# Patient Record
Sex: FEMALE | Race: OTHER | Employment: FULL TIME | URBAN - METROPOLITAN AREA
[De-identification: names, ages, dates, MRNs, and addresses within clinical notes are randomized per-mention and may not be internally consistent; named-entity substitution may affect disease eponyms.]

---

## 2017-10-19 ENCOUNTER — APPOINTMENT (OUTPATIENT)
Dept: GENERAL RADIOLOGY | Age: 37
End: 2017-10-19
Attending: EMERGENCY MEDICINE
Payer: COMMERCIAL

## 2017-10-19 ENCOUNTER — HOSPITAL ENCOUNTER (OUTPATIENT)
Age: 37
Setting detail: OBSERVATION
Discharge: HOME | End: 2017-10-20
Attending: EMERGENCY MEDICINE | Admitting: INTERNAL MEDICINE
Payer: COMMERCIAL

## 2017-10-19 ENCOUNTER — APPOINTMENT (OUTPATIENT)
Dept: CT IMAGING | Age: 37
End: 2017-10-19
Attending: EMERGENCY MEDICINE
Payer: COMMERCIAL

## 2017-10-19 DIAGNOSIS — R55 SYNCOPE, UNSPECIFIED SYNCOPE TYPE: Primary | ICD-10-CM

## 2017-10-19 PROBLEM — E66.9 OBESITY (BMI 30-39.9): Chronic | Status: ACTIVE | Noted: 2017-10-19

## 2017-10-19 PROBLEM — D72.829 LEUKOCYTOSIS: Status: ACTIVE | Noted: 2017-10-19

## 2017-10-19 LAB
ALBUMIN SERPL-MCNC: 3.8 G/DL (ref 3.5–5)
ALBUMIN/GLOB SERPL: 0.9 {RATIO} (ref 1.1–2.2)
ALP SERPL-CCNC: 143 U/L (ref 45–117)
ALT SERPL-CCNC: 21 U/L (ref 12–78)
AMPHET UR QL SCN: NEGATIVE
ANION GAP SERPL CALC-SCNC: 10 MMOL/L (ref 5–15)
APPEARANCE UR: CLEAR
AST SERPL-CCNC: 24 U/L (ref 15–37)
BACTERIA URNS QL MICRO: NEGATIVE /HPF
BARBITURATES UR QL SCN: NEGATIVE
BASOPHILS # BLD: 0 K/UL (ref 0–0.1)
BASOPHILS NFR BLD: 0 % (ref 0–1)
BENZODIAZ UR QL: NEGATIVE
BILIRUB SERPL-MCNC: 0.3 MG/DL (ref 0.2–1)
BILIRUB UR QL: NEGATIVE
BUN SERPL-MCNC: 9 MG/DL (ref 6–20)
BUN/CREAT SERPL: 10 (ref 12–20)
CALCIUM SERPL-MCNC: 9 MG/DL (ref 8.5–10.1)
CANNABINOIDS UR QL SCN: NEGATIVE
CHLORIDE SERPL-SCNC: 103 MMOL/L (ref 97–108)
CO2 SERPL-SCNC: 25 MMOL/L (ref 21–32)
COCAINE UR QL SCN: NEGATIVE
COLOR UR: ABNORMAL
CREAT SERPL-MCNC: 0.89 MG/DL (ref 0.55–1.02)
D DIMER PPP FEU-MCNC: 0.22 MG/L FEU (ref 0–0.65)
DIFFERENTIAL METHOD BLD: ABNORMAL
DRUG SCRN COMMENT,DRGCM: NORMAL
EOSINOPHIL # BLD: 0.1 K/UL (ref 0–0.4)
EOSINOPHIL NFR BLD: 1 % (ref 0–7)
EPITH CASTS URNS QL MICRO: ABNORMAL /LPF
ERYTHROCYTE [DISTWIDTH] IN BLOOD BY AUTOMATED COUNT: 12.8 % (ref 11.5–14.5)
GLOBULIN SER CALC-MCNC: 4.4 G/DL (ref 2–4)
GLUCOSE SERPL-MCNC: 98 MG/DL (ref 65–100)
GLUCOSE UR STRIP.AUTO-MCNC: NEGATIVE MG/DL
HCG UR QL: NEGATIVE
HCT VFR BLD AUTO: 42.6 % (ref 35–47)
HGB BLD-MCNC: 14.2 G/DL (ref 11.5–16)
HGB UR QL STRIP: NEGATIVE
KETONES UR QL STRIP.AUTO: NEGATIVE MG/DL
LACTATE SERPL-SCNC: 0.7 MMOL/L (ref 0.4–2)
LEUKOCYTE ESTERASE UR QL STRIP.AUTO: NEGATIVE
LYMPHOCYTES # BLD: 2.2 K/UL (ref 0.8–3.5)
LYMPHOCYTES NFR BLD: 15 % (ref 12–49)
MCH RBC QN AUTO: 29.2 PG (ref 26–34)
MCHC RBC AUTO-ENTMCNC: 33.3 G/DL (ref 30–36.5)
MCV RBC AUTO: 87.7 FL (ref 80–99)
METHADONE UR QL: NEGATIVE
MONOCYTES # BLD: 0.9 K/UL (ref 0–1)
MONOCYTES NFR BLD: 6 % (ref 5–13)
NEUTS SEG # BLD: 11.9 K/UL (ref 1.8–8)
NEUTS SEG NFR BLD: 78 % (ref 32–75)
NITRITE UR QL STRIP.AUTO: NEGATIVE
OPIATES UR QL: NEGATIVE
PCP UR QL: NEGATIVE
PH UR STRIP: 6.5 [PH] (ref 5–8)
PLATELET # BLD AUTO: 416 K/UL (ref 150–400)
POTASSIUM SERPL-SCNC: 4.1 MMOL/L (ref 3.5–5.1)
PROT SERPL-MCNC: 8.2 G/DL (ref 6.4–8.2)
PROT UR STRIP-MCNC: NEGATIVE MG/DL
RBC # BLD AUTO: 4.86 M/UL (ref 3.8–5.2)
RBC #/AREA URNS HPF: ABNORMAL /HPF (ref 0–5)
SODIUM SERPL-SCNC: 138 MMOL/L (ref 136–145)
SP GR UR REFRACTOMETRY: 1.01 (ref 1–1.03)
TROPONIN I SERPL-MCNC: <0.04 NG/ML
UR CULT HOLD, URHOLD: NORMAL
UROBILINOGEN UR QL STRIP.AUTO: 0.2 EU/DL (ref 0.2–1)
WBC # BLD AUTO: 15.1 K/UL (ref 3.6–11)
WBC URNS QL MICRO: ABNORMAL /HPF (ref 0–4)

## 2017-10-19 PROCEDURE — 71020 XR CHEST PA LAT: CPT

## 2017-10-19 PROCEDURE — 70450 CT HEAD/BRAIN W/O DYE: CPT

## 2017-10-19 PROCEDURE — 93005 ELECTROCARDIOGRAM TRACING: CPT

## 2017-10-19 PROCEDURE — 83605 ASSAY OF LACTIC ACID: CPT | Performed by: EMERGENCY MEDICINE

## 2017-10-19 PROCEDURE — 99285 EMERGENCY DEPT VISIT HI MDM: CPT

## 2017-10-19 PROCEDURE — 81025 URINE PREGNANCY TEST: CPT

## 2017-10-19 PROCEDURE — 85379 FIBRIN DEGRADATION QUANT: CPT | Performed by: EMERGENCY MEDICINE

## 2017-10-19 PROCEDURE — 99218 HC RM OBSERVATION: CPT

## 2017-10-19 PROCEDURE — 81001 URINALYSIS AUTO W/SCOPE: CPT | Performed by: EMERGENCY MEDICINE

## 2017-10-19 PROCEDURE — 74011250636 HC RX REV CODE- 250/636: Performed by: EMERGENCY MEDICINE

## 2017-10-19 PROCEDURE — 36415 COLL VENOUS BLD VENIPUNCTURE: CPT | Performed by: EMERGENCY MEDICINE

## 2017-10-19 PROCEDURE — 96360 HYDRATION IV INFUSION INIT: CPT

## 2017-10-19 PROCEDURE — 84484 ASSAY OF TROPONIN QUANT: CPT | Performed by: EMERGENCY MEDICINE

## 2017-10-19 PROCEDURE — 80053 COMPREHEN METABOLIC PANEL: CPT | Performed by: EMERGENCY MEDICINE

## 2017-10-19 PROCEDURE — 80307 DRUG TEST PRSMV CHEM ANLYZR: CPT | Performed by: EMERGENCY MEDICINE

## 2017-10-19 PROCEDURE — 85025 COMPLETE CBC W/AUTO DIFF WBC: CPT | Performed by: EMERGENCY MEDICINE

## 2017-10-19 RX ADMIN — SODIUM CHLORIDE 1000 ML: 9 INJECTION, SOLUTION INTRAVENOUS at 19:56

## 2017-10-19 NOTE — IP AVS SNAPSHOT
Shefali Foster 
 
 
 [address] 1007 [facility] 
[phone] Patient: Halima Shaver MRN: [id number] UEJ:97/51/3068 You are allergic to the following Allergen Reactions Codeine Nausea Only Immunizations Administered for This Admission Name Date Influenza Vaccine (Quad) PF 10/20/2017 Recent Documentation Height Weight BMI OB Status Smoking Status 1.753 m 96.5 kg 31.42 kg/m2 Having regular periods Never Smoker Unresulted Labs Order Current Status T4, FREE In process Emergency Contacts Name Discharge Info Relation Home Work [employer] DISCHARGE CAREGIVER [3] Life Partner [7] [phone] About your hospitalization You were admitted on:  October 19, 2017 You last received care in the:  [facility] 5M1 MED SURG 1 You were discharged on:  October 20, 2017 Unit phone number:  [phone] Why you were hospitalized Your primary diagnosis was:  Blackout Your diagnoses also included:  Leukocytosis, Obesity (Bmi 30-39.9), Acquired Hypothyroidism Providers Seen During Your Hospitalizations Provider Role Specialty Primary office phone Marco Cintron MD Attending Provider Emergency Medicine [phone] Samantha Ely MD Attending Provider Internal Medicine [phone] Your Primary Care Physician (PCP) Primary Care Physician Office Phone Office Fax NONE ** None ** ** None ** Follow-up Information Follow up With Details Comments Contact Info None   None ([phone]) Patient stated that they have no PCP Zeynep Hairston MD Schedule an appointment as soon as possible for a visit As needed [address] 
[phone] Current Discharge Medication List  
  
CONTINUE these medications which have NOT CHANGED Dose & Instructions Dispensing Information Comments Morning Noon Evening Bedtime buPROPion  mg XL tablet Commonly known as:  Christine Mata Your last dose was: Your next dose is:    
   
   
 Dose:  300 mg Take 300 mg by mouth every morning. Refills:  0  
     
   
   
   
  
 hydrocortisone-pramoxine rectal foam  
Commonly known as:  PROCTOFOAM HC Your last dose was: Your next dose is:    
   
   
 Dose:  1 Applicator Insert 1 Applicator into rectum two (2) times a day. Quantity:  1 Can Refills:  0 PARoxetine 20 mg tablet Commonly known as:  PAXIL Your last dose was: Your next dose is:    
   
   
 Dose:  20 mg Take 20 mg by mouth daily. Refills:  0 PROCTOSOL HC 2.5 % rectal cream  
Generic drug:  hydrocortisone Your last dose was: Your next dose is: Insert  into rectum four (4) times daily. Refills:  0  
     
   
   
   
  
 spironolactone 100 mg tablet Commonly known as:  ALDACTONE Your last dose was: Your next dose is:    
   
   
 Dose:  100 mg Take 100 mg by mouth daily. Refills:  0 Discharge Instructions Patient Discharge Instructions Erika Ramsey / [id number] : 1980 Admitted 10/19/2017 Discharged: 10/20/2017 Primary Diagnoses Problem List as of 10/20/2017  Date Reviewed: 10/20/2017 Codes Class Noted - Resolved * (Principal)Blackout Leukocytosis Obesity (BMI 30-39.9) (Chronic) Take Home Medications · It is important that you take the medication exactly as they are prescribed. · Keep your medication in the bottles provided by the pharmacist and keep a list of the medication names, dosages, and times to be taken in your wallet. · Do not take other medications without consulting your doctor. What to do at [facility] Recommended diet: Regular Diet Recommended activity: Activity as tolerated If you experience worse symptoms, please follow up with your PCP, cardiology, neurology. Follow-up with your PCP in a few weeks Information obtained by : 
I understand that if any problems occur once I am at home I am to contact my physician. I understand and acknowledge receipt of the instructions indicated above. Physician's or R.N.'s Signature                                                                  Date/Time Patient or Representative Signature                                                          Date/Time Discharge Orders None [employer] Announcement We are excited to announce that we are making your provider's discharge notes available to you in [employer]. You will see these notes when they are completed and signed by the physician that discharged you from your recent hospital stay. If you have any questions or concerns about any information you see in [employer], please call the Health Information Department where you were seen or reach out to your Primary Care Provider for more information about your plan of care. Introducing [facility]! Dear Kameron Thomas: Thank you for requesting a [employer] account. Our records indicate that you already have an active [employer] account. You can access your account anytime at [email or web address] Did you know that you can access your hospital and ER discharge instructions at any time in [employer]? You can also review all of your test results from your hospital stay or ER visit. Additional Information If you have questions, please visit the Frequently Asked Questions section of the [employer] website at [email or web address] Remember, MyChart is NOT to be used for urgent needs. For medical emergencies, dial 911. Now available from your iPhone and Android! General Information Please provide this summary of care documentation to your next provider. Patient Signature:  ____________________________________________________________ Date:  ____________________________________________________________  
  
Alecia Rutledge Provider Signature:  ____________________________________________________________ Date:  ____________________________________________________________

## 2017-10-19 NOTE — ED TRIAGE NOTES
1630 I was having a leg cramp and stood up, after It went away I was going to get a glass of water and I passed out. I think I was out for 35-40 minutes, when I woke up I was profusely sweating and felt like I was going to pass out again. No longer feels like I am going to pass out but I am feeling kind of foggy.

## 2017-10-19 NOTE — ED PROVIDER NOTES
HPI Comments: Selma Acosta is a 38 yo F with history of PCOS who presents to the ED with headache following syncopal episode with approximately 45 minute period of unconsciousness. She states that around 4:20 she was messaging on her computer got a cramp in her right calf. She stood to stretch it out which helped and then felt thirsty and went to the kitchen to get a drink. The next thing she knows she has awoken on the floor with her dog licking her face. She had a headache at that time and felt very groggy and was sweating profusely. She denied chest pain, nausea or shortness of breath. She she tried to sit up she felt like she would pass out again. It took several minutes to climb the stairs to her bathroom and then she called her friend at 5:20 to come get her to bring her to the ED. She states that she continues to have a headache which she rates 6/10 and feels fatigued but much better than when she first woke up. She denies history of arhythmia. She does not smoke and does not use OCP's        History reviewed. No pertinent past medical history. Past Surgical History:   Procedure Laterality Date    HX APPENDECTOMY      HX OTHER SURGICAL      anal fisure repair         History reviewed. No pertinent family history. Social History     Social History    Marital status: SINGLE     Spouse name: N/A    Number of children: N/A    Years of education: N/A     Occupational History    Not on file. Social History Main Topics    Smoking status: Never Smoker    Smokeless tobacco: Never Used    Alcohol use No    Drug use: No    Sexual activity: Not on file     Other Topics Concern    Not on file     Social History Narrative         ALLERGIES: Codeine    Review of Systems   Constitutional: Positive for diaphoresis and fatigue. HENT: Negative for sore throat. Eyes: Negative for visual disturbance. Respiratory: Negative for cough and shortness of breath.     Cardiovascular: Positive for syncope. Negative for chest pain. Gastrointestinal: Negative for abdominal pain, nausea and vomiting. Genitourinary: Negative for dysuria. Musculoskeletal: Positive for myalgias (right calf cramp, now resolved). Skin: Negative for rash. Neurological: Positive for syncope and headaches. Vitals:    10/19/17 1955   BP: 141/75   Pulse: 71   Resp: 18   Temp: 98.3 °F (36.8 °C)   SpO2: 96%   Weight: 96.5 kg (212 lb 11.9 oz)   Height: 5' 9\" (1.753 m)            Physical Exam   Constitutional: She appears well-developed and well-nourished. No distress. HENT:   Head: Normocephalic and atraumatic. Mouth/Throat: Oropharynx is clear and moist.   Eyes: Conjunctivae and EOM are normal.   Neck: Normal range of motion and phonation normal.   Cardiovascular: Normal rate, regular rhythm and intact distal pulses. No murmur heard. Pulmonary/Chest: Effort normal. No respiratory distress. She has no decreased breath sounds. She has no wheezes. She has no rales. Abdominal: She exhibits no distension. There is no tenderness. There is no rigidity, no rebound and no guarding. Musculoskeletal: Normal range of motion. She exhibits no tenderness. Right lower leg: She exhibits no tenderness and no swelling. Left lower leg: She exhibits no tenderness and no swelling. Neurological: She is alert. She is not disoriented. No cranial nerve deficit. She exhibits normal muscle tone. Coordination normal.   Skin: Skin is warm and dry. She is not diaphoretic. No pallor. Nursing note and vitals reviewed. ED EKG interpretation:  Rhythm: normal sinus rhythm; and regular . Rate (approx.): 66; Axis: normal; P wave: normal; QRS interval: normal ; ST/T wave: non-specific changes; Other findings: No prior EKG available for comparison. This EKG was interpreted by Darshana Nieves MD,ED Provider. MDM   Syncope/ prolonged period of unconsciousness. Awoke with headache. Will r/o ICH.   Neuro exam normal.  Cardiac monitor normal.  Check labs including troponin, ddimer, CMP, CBC. ED Course     9:35 PM  Discussed with Dr Hesham Velazco. Will admit. Labs reviewed. Will add on urine drug screen.       Procedures

## 2017-10-19 NOTE — IP AVS SNAPSHOT
[address] 
[phone] Patient: Luciano Mccormick MRN: [id number] UTN:09/66/1383 Current Discharge Medication List  
  
CONTINUE these medications which have NOT CHANGED Dose & Instructions Dispensing Information Comments Morning Noon Evening Bedtime buPROPion  mg XL tablet Commonly known as:  Jud Sizer Your last dose was: Your next dose is:    
   
   
 Dose:  300 mg Take 300 mg by mouth every morning. Refills:  0  
     
   
   
   
  
 hydrocortisone-pramoxine rectal foam  
Commonly known as:  PROCTOFOAM HC Your last dose was: Your next dose is:    
   
   
 Dose:  1 Applicator Insert 1 Applicator into rectum two (2) times a day. Quantity:  1 Can Refills:  0 PARoxetine 20 mg tablet Commonly known as:  PAXIL Your last dose was: Your next dose is:    
   
   
 Dose:  20 mg Take 20 mg by mouth daily. Refills:  0 PROCTOSOL HC 2.5 % rectal cream  
Generic drug:  hydrocortisone Your last dose was: Your next dose is: Insert  into rectum four (4) times daily. Refills:  0  
     
   
   
   
  
 spironolactone 100 mg tablet Commonly known as:  ALDACTONE Your last dose was: Your next dose is:    
   
   
 Dose:  100 mg Take 100 mg by mouth daily. Refills:  0

## 2017-10-20 ENCOUNTER — APPOINTMENT (OUTPATIENT)
Dept: MRI IMAGING | Age: 37
End: 2017-10-20
Attending: NURSE PRACTITIONER
Payer: COMMERCIAL

## 2017-10-20 VITALS
SYSTOLIC BLOOD PRESSURE: 125 MMHG | DIASTOLIC BLOOD PRESSURE: 81 MMHG | OXYGEN SATURATION: 97 % | HEART RATE: 75 BPM | RESPIRATION RATE: 18 BRPM | WEIGHT: 212.74 LBS | TEMPERATURE: 98.2 F | HEIGHT: 69 IN | BODY MASS INDEX: 31.51 KG/M2

## 2017-10-20 PROBLEM — E03.9 ACQUIRED HYPOTHYROIDISM: Status: ACTIVE | Noted: 2017-10-20

## 2017-10-20 LAB
ALBUMIN SERPL-MCNC: 3.2 G/DL (ref 3.5–5)
ALBUMIN/GLOB SERPL: 0.9 {RATIO} (ref 1.1–2.2)
ALP SERPL-CCNC: 126 U/L (ref 45–117)
ALT SERPL-CCNC: 18 U/L (ref 12–78)
ANION GAP SERPL CALC-SCNC: 6 MMOL/L (ref 5–15)
AST SERPL-CCNC: 22 U/L (ref 15–37)
ATRIAL RATE: 66 BPM
BILIRUB SERPL-MCNC: 0.3 MG/DL (ref 0.2–1)
BUN SERPL-MCNC: 8 MG/DL (ref 6–20)
BUN/CREAT SERPL: 9 (ref 12–20)
CALCIUM SERPL-MCNC: 8.3 MG/DL (ref 8.5–10.1)
CALCULATED P AXIS, ECG09: 28 DEGREES
CALCULATED R AXIS, ECG10: 28 DEGREES
CALCULATED T AXIS, ECG11: 22 DEGREES
CHLORIDE SERPL-SCNC: 106 MMOL/L (ref 97–108)
CO2 SERPL-SCNC: 27 MMOL/L (ref 21–32)
CREAT SERPL-MCNC: 0.85 MG/DL (ref 0.55–1.02)
DIAGNOSIS, 93000: NORMAL
ERYTHROCYTE [DISTWIDTH] IN BLOOD BY AUTOMATED COUNT: 12.8 % (ref 11.5–14.5)
GLOBULIN SER CALC-MCNC: 3.7 G/DL (ref 2–4)
GLUCOSE SERPL-MCNC: 95 MG/DL (ref 65–100)
HCT VFR BLD AUTO: 37.9 % (ref 35–47)
HGB BLD-MCNC: 12.7 G/DL (ref 11.5–16)
MAGNESIUM SERPL-MCNC: 1.7 MG/DL (ref 1.6–2.4)
MCH RBC QN AUTO: 29.1 PG (ref 26–34)
MCHC RBC AUTO-ENTMCNC: 33.5 G/DL (ref 30–36.5)
MCV RBC AUTO: 86.7 FL (ref 80–99)
P-R INTERVAL, ECG05: 124 MS
PHOSPHATE SERPL-MCNC: 2.5 MG/DL (ref 2.6–4.7)
PLATELET # BLD AUTO: 390 K/UL (ref 150–400)
POTASSIUM SERPL-SCNC: 3.7 MMOL/L (ref 3.5–5.1)
PROT SERPL-MCNC: 6.9 G/DL (ref 6.4–8.2)
Q-T INTERVAL, ECG07: 392 MS
QRS DURATION, ECG06: 78 MS
QTC CALCULATION (BEZET), ECG08: 410 MS
RBC # BLD AUTO: 4.37 M/UL (ref 3.8–5.2)
SODIUM SERPL-SCNC: 139 MMOL/L (ref 136–145)
T4 FREE SERPL-MCNC: 1 NG/DL (ref 0.8–1.5)
TSH SERPL DL<=0.05 MIU/L-ACNC: 6.69 UIU/ML (ref 0.36–3.74)
VENTRICULAR RATE, ECG03: 66 BPM
WBC # BLD AUTO: 10.8 K/UL (ref 3.6–11)

## 2017-10-20 PROCEDURE — 85027 COMPLETE CBC AUTOMATED: CPT | Performed by: INTERNAL MEDICINE

## 2017-10-20 PROCEDURE — 83735 ASSAY OF MAGNESIUM: CPT | Performed by: INTERNAL MEDICINE

## 2017-10-20 PROCEDURE — 70553 MRI BRAIN STEM W/O & W/DYE: CPT

## 2017-10-20 PROCEDURE — 84439 ASSAY OF FREE THYROXINE: CPT | Performed by: NURSE PRACTITIONER

## 2017-10-20 PROCEDURE — 84100 ASSAY OF PHOSPHORUS: CPT | Performed by: INTERNAL MEDICINE

## 2017-10-20 PROCEDURE — 74011250636 HC RX REV CODE- 250/636: Performed by: INTERNAL MEDICINE

## 2017-10-20 PROCEDURE — 99218 HC RM OBSERVATION: CPT

## 2017-10-20 PROCEDURE — 95816 EEG AWAKE AND DROWSY: CPT | Performed by: NURSE PRACTITIONER

## 2017-10-20 PROCEDURE — A9576 INJ PROHANCE MULTIPACK: HCPCS | Performed by: INTERNAL MEDICINE

## 2017-10-20 PROCEDURE — 80053 COMPREHEN METABOLIC PANEL: CPT | Performed by: INTERNAL MEDICINE

## 2017-10-20 PROCEDURE — 84443 ASSAY THYROID STIM HORMONE: CPT | Performed by: NURSE PRACTITIONER

## 2017-10-20 PROCEDURE — 90471 IMMUNIZATION ADMIN: CPT

## 2017-10-20 PROCEDURE — 36415 COLL VENOUS BLD VENIPUNCTURE: CPT | Performed by: INTERNAL MEDICINE

## 2017-10-20 PROCEDURE — 96361 HYDRATE IV INFUSION ADD-ON: CPT

## 2017-10-20 PROCEDURE — 90686 IIV4 VACC NO PRSV 0.5 ML IM: CPT | Performed by: INTERNAL MEDICINE

## 2017-10-20 PROCEDURE — 96372 THER/PROPH/DIAG INJ SC/IM: CPT

## 2017-10-20 RX ORDER — ACETAMINOPHEN 325 MG/1
650 TABLET ORAL
Status: DISCONTINUED | OUTPATIENT
Start: 2017-10-20 | End: 2017-10-20 | Stop reason: HOSPADM

## 2017-10-20 RX ORDER — SODIUM CHLORIDE 9 MG/ML
75 INJECTION, SOLUTION INTRAVENOUS CONTINUOUS
Status: DISCONTINUED | OUTPATIENT
Start: 2017-10-20 | End: 2017-10-20 | Stop reason: HOSPADM

## 2017-10-20 RX ORDER — PROCHLORPERAZINE EDISYLATE 5 MG/ML
10 INJECTION INTRAMUSCULAR; INTRAVENOUS
Status: DISCONTINUED | OUTPATIENT
Start: 2017-10-20 | End: 2017-10-20 | Stop reason: HOSPADM

## 2017-10-20 RX ORDER — ZOLPIDEM TARTRATE 5 MG/1
5 TABLET ORAL
Status: DISCONTINUED | OUTPATIENT
Start: 2017-10-20 | End: 2017-10-20 | Stop reason: HOSPADM

## 2017-10-20 RX ORDER — ENOXAPARIN SODIUM 100 MG/ML
40 INJECTION SUBCUTANEOUS EVERY 24 HOURS
Status: DISCONTINUED | OUTPATIENT
Start: 2017-10-20 | End: 2017-10-20 | Stop reason: HOSPADM

## 2017-10-20 RX ORDER — SODIUM CHLORIDE 0.9 % (FLUSH) 0.9 %
5-10 SYRINGE (ML) INJECTION AS NEEDED
Status: DISCONTINUED | OUTPATIENT
Start: 2017-10-20 | End: 2017-10-20 | Stop reason: HOSPADM

## 2017-10-20 RX ORDER — OXYCODONE AND ACETAMINOPHEN 5; 325 MG/1; MG/1
1 TABLET ORAL
Status: DISCONTINUED | OUTPATIENT
Start: 2017-10-20 | End: 2017-10-20 | Stop reason: HOSPADM

## 2017-10-20 RX ORDER — HYDROMORPHONE HYDROCHLORIDE 1 MG/ML
0.5 INJECTION, SOLUTION INTRAMUSCULAR; INTRAVENOUS; SUBCUTANEOUS
Status: DISCONTINUED | OUTPATIENT
Start: 2017-10-20 | End: 2017-10-20 | Stop reason: HOSPADM

## 2017-10-20 RX ORDER — SODIUM CHLORIDE 0.9 % (FLUSH) 0.9 %
5-10 SYRINGE (ML) INJECTION EVERY 8 HOURS
Status: DISCONTINUED | OUTPATIENT
Start: 2017-10-20 | End: 2017-10-20 | Stop reason: HOSPADM

## 2017-10-20 RX ADMIN — SODIUM CHLORIDE 75 ML/HR: 900 INJECTION, SOLUTION INTRAVENOUS at 02:16

## 2017-10-20 RX ADMIN — GADOTERIDOL 15 ML: 279.3 INJECTION, SOLUTION INTRAVENOUS at 16:52

## 2017-10-20 RX ADMIN — INFLUENZA VIRUS VACCINE 0.5 ML: 15; 15; 15; 15 SUSPENSION INTRAMUSCULAR at 17:52

## 2017-10-20 RX ADMIN — Medication 10 ML: at 02:17

## 2017-10-20 RX ADMIN — Medication 10 ML: at 14:00

## 2017-10-20 RX ADMIN — ENOXAPARIN SODIUM 40 MG: 40 INJECTION SUBCUTANEOUS at 08:49

## 2017-10-20 NOTE — ED NOTES
Patient resting on stretcher in no distress. Patient updated regarding room number. Patient repositioned for comfort.

## 2017-10-20 NOTE — ED NOTES
Patient ambulatory to restroom with a steady gait to obtain ordered sample. Patient tolerating ambulation well.

## 2017-10-20 NOTE — ED NOTES
IV access established and blood samples sent to lab for testing as ordered. Patient updated regarding plan of care and associated time constraints. Patient verbalizes understanding and agreement to current care plan. Patient remains alert and oriented. Skin warm, pink, and dry. Respirations even and unlabored. Family at bedside.

## 2017-10-20 NOTE — PROGRESS NOTES
0100  Primary Nurse Trinity Mcmillan RN and Tyrone Aguila RN performed a dual skin assessment on this patient No impairment noted  Martin score is 21    0045   Dr. Mian Godinez made aware that pt on floor. 7536  TRANSFER - IN REPORT:    Verbal report received from Leticia Galo RN(name) on Jose Nelson  being received from ED (unit) for routine progression of care      Report consisted of patients Situation, Background, Assessment and   Recommendations(SBAR). Information from the following report(s) SBAR, Kardex, Intake/Output, MAR, Recent Results and Med Rec Status was reviewed with the receiving nurse. Opportunity for questions and clarification was provided. Assessment completed upon patients arrival to unit and care assumed.

## 2017-10-20 NOTE — PROGRESS NOTES
CM Note:  Met with pt for d/c planning. PTA pt was independent with ADL's, was driving and walking. No DME at home. She has never had home health. Her emergency contact is her partner, Thai Lee ([phone]), who will drive her home at d/c. Pt has Rx coverage and gets her medication from [facility] in [place]. No anticipated needs for d/c. Home when medically stable.   MEGHANA Lim    Care Management Interventions  PCP Verified by CM: No (Patient was given a list of Day[facility] Lewis providers as she is without a PCP at this time.)  Palliative Care Criteria Met (RRAT>21 & CHF Dx)?: No  MyChart Signup: No  Discharge Durable Medical Equipment: No  Physical Therapy Consult: No  Occupational Therapy Consult: No  Speech Therapy Consult: No  Current Support Network: Lives with Spouse (Pt lives with her partner in a 2 story house with 15-20 steps between floors.)  Confirm Follow Up Transport: Family (Pt's partner to drive her home at d/c.)  Plan discussed with Pt/Family/Caregiver: Yes  Discharge Location  Discharge Placement: Home with two level

## 2017-10-20 NOTE — ED NOTES
TRANSFER - OUT REPORT:    Verbal report given to Jos Leos with [facility](name) on Rosalinda Gonzalez  being transferred to [facility] 509(unit) for routine progression of care       Report consisted of patients Situation, Background, Assessment and   Recommendations(SBAR). Information from the following report(s) ED Summary was reviewed with the receiving nurse. Lines:   Peripheral IV 10/19/17 Right Antecubital (Active)   Site Assessment Clean, dry, & intact 10/19/2017  7:50 PM   Phlebitis Assessment 0 10/19/2017  7:50 PM   Infiltration Assessment 0 10/19/2017  7:50 PM   Dressing Status Clean, dry, & intact 10/19/2017  7:50 PM   Dressing Type Transparent 10/19/2017  7:50 PM   Hub Color/Line Status Pink;Flushed 10/19/2017  7:50 PM   Action Taken Blood drawn 10/19/2017  7:50 PM        Opportunity for questions and clarification was provided.       Patient transported with:   Monitor  [address]

## 2017-10-20 NOTE — ED NOTES
TRANSPORTED VIA STRETCHER BY [facility] TO [facility] 509. Continues complaining of head ache.

## 2017-10-20 NOTE — ED NOTES
TRANSFER - OUT REPORT:    Verbal report given to Estelle Pineda RN(name) on Suhail Watts  being transferred to [facility] 509(unit) for routine progression of care       Report consisted of patients Situation, Background, Assessment and   Recommendations(SBAR). Information from the following report(s) SBAR, ED Summary, Recent Results and Cardiac Rhythm NSR was reviewed with the receiving nurse. Lines:   Peripheral IV 10/19/17 Right Antecubital (Active)   Site Assessment Clean, dry, & intact 10/19/2017  7:50 PM   Phlebitis Assessment 0 10/19/2017  7:50 PM   Infiltration Assessment 0 10/19/2017  7:50 PM   Dressing Status Clean, dry, & intact 10/19/2017  7:50 PM   Dressing Type Transparent 10/19/2017  7:50 PM   Hub Color/Line Status Pink;Flushed 10/19/2017  7:50 PM   Action Taken Blood drawn 10/19/2017  7:50 PM        Opportunity for questions and clarification was provided.       Patient transported with:   Monitor    Saline Lock

## 2017-10-20 NOTE — PROGRESS NOTES
Hemal Rueda [facility] [address]  [address], [place], [address] [facility]  [phone]      Medical Progress Note      NAME: Berta Douglas   :  1980  MRM:  [id number]    Date/Time: 10/20/2017  9:28 AM       Assessment and Plan:     Blackout - Unclear etiology. Several years of monthly recurrent milder self limited symptoms (weak, light headed, sweaty, confusion). Sz vs Vagal vs arrhythmia vs other. So far negative workup. Normal neuro exam.  Awaiting EEG. Normal orthostatics. Normal tele, but I will conslut cadiology, as I think some likelihood of prince/pauses requiring Holter analysis. If cleared by neurology, can DC home. Driving restrictions per their recs    Leukocytosis - POA, mild, resolved spontaneously. No ABx. No further workup. Obesity (BMI 30-39.9) - Advise weight loss       Subjective:     Chief Complaint:  Feels well now    ROS:  (bold if positive, if negative)      Tolerating PT   Tolerating Diet        Objective:     Last 24hrs VS reviewed since prior progress note.  Most recent are:    Visit Vitals    /63 (BP 1 Location: Left arm, BP Patient Position: At rest)    Pulse 73    Temp 98.2 °F (36.8 °C)    Resp 18    Ht 5' 9\" (1.753 m)    Wt 96.5 kg (212 lb 11.9 oz)    SpO2 95%    BMI 31.42 kg/m2     SpO2 Readings from Last 6 Encounters:   10/20/17 95%   16 98%   16 96%   14 96%        No intake or output data in the 24 hours ending 10/20/17 0098     Physical Exam:    Gen:  Obese, in no acute distress  HEENT:  Pink conjunctivae, PERRL, hearing intact to voice, moist mucous membranes  Neck:  Supple, without masses, thyroid non-tender  Resp:  No accessory muscle use, clear breath sounds without wheezes rales or rhonchi  Card:  No murmurs, normal S1, S2 without thrills, bruits or peripheral edema  Abd:  Soft, non-tender, non-distended, normoactive bowel sounds are present, no mass  Lymph:  No cervical or inguinal adenopathy  Musc:  No cyanosis or clubbing  Skin:  No rashes or ulcers, skin turgor is good  Neuro:  Cranial nerves are grossly intact, no focal motor weakness, follows commands appropriately  Psych:  Good insight, oriented to person, place and time, alert    Telemetry reviewed:   normal sinus rhythm  __________________________________________________________________  Medications Reviewed: (see below)  Medications:     Current Facility-Administered Medications   Medication Dose Route Frequency    0.9% sodium chloride infusion  75 mL/hr IntraVENous CONTINUOUS    sodium chloride (NS) flush 5-10 mL  5-10 mL IntraVENous Q8H    sodium chloride (NS) flush 5-10 mL  5-10 mL IntraVENous PRN    acetaminophen (TYLENOL) tablet 650 mg  650 mg Oral Q4H PRN    oxyCODONE-acetaminophen (PERCOCET) 5-325 mg per tablet 1 Tab  1 Tab Oral Q4H PRN    HYDROmorphone (PF) (DILAUDID) injection 0.5 mg  0.5 mg IntraVENous Q4H PRN    prochlorperazine (COMPAZINE) injection 10 mg  10 mg IntraVENous Q6H PRN    zolpidem (AMBIEN) tablet 5 mg  5 mg Oral QHS PRN    enoxaparin (LOVENOX) injection 40 mg  40 mg SubCUTAneous Q24H    influenza vaccine 2017-18 (3 yrs+)(PF) (FLUZONE QUAD/FLUARIX QUAD) injection 0.5 mL  0.5 mL IntraMUSCular PRIOR TO DISCHARGE        Lab Data Reviewed: (see below)  Lab Review:     Recent Labs      10/20/17   0535  10/19/17   1953   WBC  10.8  15.1*   HGB  12.7  14.2   HCT  37.9  42.6   PLT  390  416*     Recent Labs      10/20/17   0535  10/19/17   1953   NA  139  138   K  3.7  4.1   CL  106  103   CO2  27  25   GLU  95  98   BUN  8  9   CREA  0.85  0.89   CA  8.3*  9.0   MG  1.7   --    PHOS  2.5*   --    ALB  3.2*  3.8   TBILI  0.3  0.3   SGOT  22  24   ALT  18  21     No results found for: GLUCPOC  No results for input(s): PH, PCO2, PO2, HCO3, FIO2 in the last 72 hours. No results for input(s): INR in the last 72 hours.     No lab exists for component: INREXT  All Micro Results     Procedure Component Value Units Date/Time    URINE CULTURE HOLD SAMPLE [[id number]] Collected:  10/19/17 2052    Order Status:  Completed Specimen:  Urine from Serum Updated:  10/19/17 2057     Urine culture hold         URINE ON HOLD IN MICROBIOLOGY DEPT FOR 3 DAYS          I have reviewed notes of prior 24hr.     Other pertinent lab: none    Total time spent with patient: 28 [address] discussed with: Patient, Nursing Staff, Consultant/Specialist and >50% of time spent in counseling and coordination of care    Discussed:  Care Plan and D/C Planning    Prophylaxis:  H2B/PPI    Disposition:  Home w/Family           ___________________________________________________    Attending Physician: Shira Rutledge MD

## 2017-10-20 NOTE — PROCEDURES
Hemal Rueda [facility]   201 [facility], [address]   ROUTINE EEG REPORT       Name:  Mor Santiago   MR#:  [id number]   :  1980   Account #:  [de-identified]    Date of Procedure:  10/20/2017   Date of Adm:  10/19/2017       LOCATION: [facility]. REASON FOR STUDY: A 63-year-old female who was admitted for an   episode of syncope. She reports having sporadic syncopal episodes in   the past. No prior history of seizure. This study is being done to   evaluate for the cause of her syncope. CURRENT MEDICATIONS:   1. Wellbutrin. 2. Paxil. 3. Aldactone. 4. ProctoFoam.   5. Proctosol. EQUIPMENT: Technical study is performed using a digital EEG   machine according to the 10-20 [employer] placement system. INTERPRETATION: The patient is described as awake during this   recording. The background is symmetric and medium to low in   amplitude. Posterior dominant rhythm reaches 9 Hz bilaterally and is   responsive to eye opening and eye closure. Hyperventilation is done   and is unremarkable. Photic stimulation does not result in a clear   driving response on either side. The background rhythm waxes and   wanes toward the end of the recording consistent with drowsiness. Single-lead EKG was unremarkable. There are no epileptiform   discharges, focal areas of slowing, or subclinical seizures during the   recording. IMPRESSION: This is a normal awake and briefly drowsy EEG   recording. There were no epileptiform discharges, focal areas of   slowing, or subclinical seizures. Clinical correlation is necessary.         Leo Bo MD MA / LENNY   D:  10/20/2017   12:33   T:  10/20/2017   16:06   Job #:  [id number]

## 2017-10-20 NOTE — DISCHARGE SUMMARY
Physician Discharge Summary     Patient ID:  Loetta Fothergill  [id number]  89 y.o.  1980    Admit date: 10/19/2017    Discharge date and time: 10/20/2017    Admission Diagnoses: Syncope    Discharge Diagnoses:    Principal Diagnosis   Blackout                                             Other Diagnoses    Leukocytosis (10/19/2017)    Obesity (BMI 30-39.9) (10/19/2017)    PCOS /     Hypertestosteronism    Anxiety / Depression    Hospital Course:   Blackout - Unclear etiology. Several years of monthly recurrent milder self limited symptoms (weak, light headed, sweaty, confusion). Sz vs Vagal vs arrhythmia vs other. So far negative workup. Normal neuro exam.  Awaiting EEG. Normal orthostatics. Normal tele, but I will conslut cadiology, as I think some likelihood of prince/pauses requiring Holter analysis. If cleared by neurology, can DC home. Driving restrictions per their recs. Elevated TSH - I noted TSH 6.7, above baseline, but no other signs of overt hypothyroidism, and I will not start synthroid at this point. PCP can follow.     PCOS / Hypertestosteronism - Continue spironolactone    Anxiety / Depression - Continue wellbutrin and paxil     Leukocytosis - POA, mild, resolved spontaneously. No ABx. No further workup.     Obesity (BMI 30-39.9) - Advise weight loss    PCP: None    Consults: Cardiology and Neurology    Significant Diagnostic Studies: See Hospital Course    Discharged home in improved condition. Discharge Exam:   /63 (BP 1 Location: Left arm, BP Patient Position: At rest)    Pulse 73    Temp 98.2 °F (36.8 °C)    Resp 18    Ht 5' 9\" (1.753 m)    Wt 96.5 kg (212 lb 11.9 oz)    SpO2 95%    BMI 31.42 kg/m2      Gen:  Obese, in no acute distress  HEENT:  Pink conjunctivae, PERRL, hearing intact to voice, moist mucous membranes  Neck:  Supple, without masses, thyroid non-tender  Resp:  No accessory muscle use, clear breath sounds without wheezes rales or rhonchi  Card:   No murmurs, normal S1, S2 without thrills, bruits or peripheral edema  Abd:  Soft, non-tender, non-distended, normoactive bowel sounds are present, no mass  Lymph:  No cervical or inguinal adenopathy  Musc:  No cyanosis or clubbing  Skin:  No rashes or ulcers, skin turgor is good  Neuro:  Cranial nerves are grossly intact, no focal motor weakness, follows commands appropriately  Psych:  Good insight, oriented to person, place and time, alert    Patient Instructions:   Current Discharge Medication List      CONTINUE these medications which have NOT CHANGED    Details   buPROPion XL (WELLBUTRIN XL) 300 mg XL tablet Take 300 mg by mouth every morning. PARoxetine (PAXIL) 20 mg tablet Take 20 mg by mouth daily. spironolactone (ALDACTONE) 100 mg tablet Take 100 mg by mouth daily. hydrocortisone (PROCTOSOL HC) 2.5 % rectal cream Insert  into rectum four (4) times daily. hydrocortisone-pramoxine (PROCTOFOAM HC) rectal foam Insert 1 Applicator into rectum two (2) times a day. Qty: 1 Can, Refills: 0           Activity: Activity as tolerated  Diet: Regular Diet  Wound Care: None needed    Follow-up with your PCP in a few weeks.   Follow-up tests/labs - none    Signed:  Jennifer Barbosa MD  10/20/2017  9:50 AM

## 2017-10-20 NOTE — PROGRESS NOTES
I have reviewed discharge instructions with the patient. The patient verbalized understanding. I have reviewed discharge instructions with the patient. The patient verbalized understanding. Per Dr. Arely Restrepo patient may discharge if cardiologist could set patient up for Holter monitor outpatient. Dr. Fidencio Skelton contacted and stated he would set this up outpatient, and would contact patient to set-up an appointment.

## 2017-10-20 NOTE — ED NOTES
IV fluid infusion completed. Patient tolerated fluids well. Family remains at bedside. Patient voices no requests or concerns at this time. Toileting offered; patient declines at this time.

## 2017-10-20 NOTE — PROGRESS NOTES
Problem: Falls - Risk of  Goal: *Absence of Falls  Document Jose Fall Risk and appropriate interventions in the flowsheet.    Outcome: Progressing Towards Goal  Fall Risk Interventions:            Medication Interventions: Teach patient to arise slowly

## 2017-10-20 NOTE — ED NOTES
Dr. Gerard  at bedside to update patient regarding results. Further care management includes admission to [facility]. Patient verbalizes understanding and agreement.

## 2017-10-20 NOTE — H&P
[address]  [phone]    Admission History and Physical      NAME:  Erika Ramsey   :   1980   MRN:  [id number]     PCP:  None     Date/Time:  10/20/2017         Subjective:     CHIEF COMPLAINT: loss of consciouness     HISTORY OF PRESENT ILLNESS:     Ms. Rubén Ansari is a 39 y.o.  female who is admitted with blackout spell. Ms. Rubén Ansari presented to the [facility] this PM complaining of blackout spell: earlier this evening, go up to get a glass of water, lost 35-40 minutes of time, woke up on the floor, on her back, had bitten her lip, profusely sweating, was confused and felt like she might lose consciousness again for a short time afterwards    History obtained from chart review and the patient. Previous records reviewed    History reviewed. No pertinent past medical history. Past Surgical History:   Procedure Laterality Date    HX APPENDECTOMY      HX OTHER SURGICAL      anal fisure repair       Social History   Substance Use Topics    Smoking status: Never Smoker    Smokeless tobacco: Never Used    Alcohol use No        Family History   Problem Relation Age of Onset    Diabetes Mother     Diabetes Father         Allergies   Allergen Reactions    Codeine Nausea Only        Prior to Admission medications    Medication Sig Start Date End Date Taking? Authorizing Provider   buPROPion XL (WELLBUTRIN XL) 300 mg XL tablet Take 300 mg by mouth every morning. Yes Judit Hilliard MD   PARoxetine (PAXIL) 20 mg tablet Take 20 mg by mouth daily. Yes Judit Hilliard MD   spironolactone (ALDACTONE) 100 mg tablet Take 100 mg by mouth daily. Yes Judit Hilliard MD   hydrocortisone (PROCTOSOL HC) 2.5 % rectal cream Insert  into rectum four (4) times daily. Judit Hilliard MD   hydrocortisone-pramoxine (PROCTOFOAM HC) rectal foam Insert 1 Applicator into rectum two (2) times a day.  16   Shahana Hebert Abron Gowers, PA-C         Review of Systems:  (bold if positive, if negative)    Gen:  Eyes:  ENT:  CVS:  dizziness, syncopePulm:  GI:    :    MS:  Skin:  Psych:  Endo:    Hem:  Renal:    Neuro:            Objective:      VITALS:    Vital signs reviewed; most recent are:    Visit Vitals    /80 (BP 1 Location: Left arm, BP Patient Position: Standing)    Pulse 68    Temp 98.4 °F (36.9 °C)    Resp 19    Ht 5' 9\" (1.753 m)    Wt 96.5 kg (212 lb 11.9 oz)    SpO2 98%    BMI 31.42 kg/m2     SpO2 Readings from Last 6 Encounters:   10/20/17 98%   09/01/16 98%   05/25/16 96%   06/24/14 96%        No intake or output data in the 24 hours ending 10/20/17 0657         Exam:     Physical Exam:    Gen: Well-developed, obese, in no acute distress  HEENT:  Pink conjunctivae, PERRL, hearing intact to voice, moist mucous membranes  Neck: Supple, without masses, thyroid non-tender  Resp: No accessory muscle use, clear breath sounds without wheezes rales or rhonchi  Card: No murmurs, normal S1, S2 without thrills, bruits or peripheral edema  Abd:  Soft, non-tender, non-distended, normoactive bowel sounds are present, no palpable organomegaly and no detectable hernias  Lymph:  No cervical or inguinal adenopathy  Musc: No cyanosis or clubbing  Skin: No rashes or ulcers, skin turgor is good  Neuro:  Cranial nerves are grossly intact, no focal motor weakness, follows commands appropriately  Psych:  Good insight, oriented to person, place and time, alert             Labs:    Recent Labs      10/20/17   0535   WBC  10.8   HGB  12.7   HCT  37.9   PLT  390     Recent Labs      10/20/17   0535   NA  139   K  3.7   CL  106   CO2  27   GLU  95   BUN  8   CREA  0.85   CA  8.3*   MG  1.7   PHOS  2.5*   ALB  3.2*   TBILI  0.3   SGOT  22   ALT  18     No results found for: GLUCPOC  No results for input(s): PH, PCO2, PO2, HCO3, FIO2 in the last 72 hours. No results for input(s): INR in the last 72 hours.     No lab exists for component: INREXT, INREXT    Additional testing:  Chest X-ray: Normal. and visualized by me.   Results not reviewed with Radiologist.       Assessment/Plan:       Principal Problem:    Blackout (10/19/2017)   - I do not think this represents true syncope, the duration is far too long for it to have been a cardiac source   - I'm not sure what this episode of LOC represents, however   - drug screen is negative so drug use seems unlikely    - a seizure seems most likely   - consult Neurology   - seizure precautions   - will likely need EEG   - check orthostatics    Active Problems:    Leukocytosis (10/19/2017)   - no apparent infection   - likely elevated due to acute issue above, possibly seizure activity      Obesity (BMI 30-39.9) (10/19/2017)   - counseled on weight loss        Code status:   - patient is FULL CODE      Total time spent with patient: 79 [address] discussed with: Patient, Family, Nursing Staff and Dr. Vasquez Many    Discussed:  Code Status, Care Plan and D/C Planning    Prophylaxis:  Lovenox and SCD's    Probable Disposition:  Home w/Family           ___________________________________________________    Attending Physician: Rosa Parks MD

## 2017-10-20 NOTE — CONSULTS
[facility]  [address] [place] [address] Ann  [phone]     Inpatient Neurology Consult  Dakota Goff, [facility]    Name:   Giles Cotto   Medical record #: [id number]  Admission Date: 10/19/2017  Consult Date:  10/20/17    Referring Provider: Dr. Juan Antonio Cotter  Chief Complaint:  syncope  Source of Hx:  Chart, pt    ____________________________________________________________________  ==============================================================    Attending Addendum    Reviewed consult note by NP Bhavna Jefferson. Agree with hx as obtained by her. Patient independently interviewed and examined. Briefly, 39 y.o. female who is admitted after syncopal episode. She had gotten up to stretch after a leg cramp, says it wasn't that painful, was walking to kitchen to get something to drink and next thing she knows she's waking up on ground about 30 minutes later, profuse sweating and felt fatigued afterwards. No warning, no palpitations, no light-headedness before the episode. Agree with NPs Mani's summary of patient reporting intermittent spells over past couple years where she'll just have a sensation that something isn't right, feels light-headed, needs to sit down, gets warm sensation all over (all that lasting 10-15 minutes) then feels wiped out afterwards. Never passed out before the episode that prompted this admission. No personal or FHx of seizure. EEG was normal.  CT head w/o contrast was also normal (reviewed images). MRI Brain pending. Cardiology to see regarding possible bradycardia or other cardiac dysrhythmia to account for syncopal episode (and prior spells?). Pt reports that her prior PCP was uncertain if she had hypothyroid (TSH was up and down) and had given her a Rx synthroid to fill at last visit but pt hasn't gotten around to filling it. Recent TSH 6.67. No current facility-administered medications on file prior to encounter.       Current Outpatient Prescriptions on File Prior to Encounter   Medication Sig Dispense Refill    buPROPion XL (WELLBUTRIN XL) 300 mg XL tablet Take 300 mg by mouth every morning.  PARoxetine (PAXIL) 20 mg tablet Take 20 mg by mouth daily.  spironolactone (ALDACTONE) 100 mg tablet Take 100 mg by mouth daily.  hydrocortisone (PROCTOSOL HC) 2.5 % rectal cream Insert  into rectum four (4) times daily.  hydrocortisone-pramoxine (PROCTOFOAM HC) rectal foam Insert 1 Applicator into rectum two (2) times a day. 1 Can 0       Patient Vitals for the past 8 hrs:   Temp Pulse Resp BP SpO2   10/20/17 1238 98.2 °F (36.8 °C) 72 18 130/80 97 %   10/20/17 0737 98.2 °F (36.8 °C) 73 18 120/63 95 %         Exam  General: awake, alert, resting, NAD. CN: EOMI, Face symmetric, Facial sensation intact bilaterally, Hearing grossly normal, Language normal (no aphasia, no dysarthria), Shrug symmetric. Motor: 5/5 in all exts. Sensory: normal LT, prick, temp, vibration x 4 exts. Cerebellar: normal FNF and H-Shin bilaterally; no tremors. DTRs: 2+ patellars, 1+ biceps; Gait: not observed    Impression/ Plan    39 y.o. female with episodes of pre-syncope for past 1-2 years and current admission for syncopal episode without warning. Known hypothyroidism (not treated yet). Never any witnessed seizure and EEG/ CT head was normal.  MRI Brain pending. DDx: vasovagal syncope vs cardiogenic syncope vs autonomic neuropathy vs complex partial seizure    Pt to see new PCP and address hypothyroidism, have cardiology eval, and if symptoms persist, she will schedule outpatient neurology follow up visit. At that point would do NCS and send for tilt-table testing to determine if any underlying neuropathy causing dizziness/ pre-syncopal spells. Will also get sleep deprived EEG at that point. Reviewed meds and discussed with pt that both wellbutrin and paxil can cause diaphoresis and dizziness.   She says she had considered that in the past and stopped either med at various times but still had the near-syncopal episodes. Okay to discharge home today if Brain MRI is normal.  D/w patient that [address] [facility] prohibits people from driving x 6 months after their last syncopal episode or seizure involving loss or alteration of awareness. She expressed understanding. Jim Leach MD  October 20, 2017          HISTORY OF PRESENT ILLNESS:   Sagrario Ureña is a 39 y.o. female with no significant PMH. The Neurology Service is asked to evaluate for syncope, after admission for patient having syncopal event with diaphoresis yesterday. 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 Her friend messaged her and she read part of text but continued walking to kitchen and all she remembers is waking up in the hallway before the kitchen without having made it to kitchen when her dog was licking her. She looked at nearby clock it was around  515pm--she says she was \"out for almost an hour per the last text she read and when she woke and looked at clock. She tried to sit up but also felt as if she was going to pass out again. Also felt so fatigued she turned on fan to keep herself awake and called friend to bring her to ED. Reports bilateral frontal HA post fall, improved now. Recent events starting about 18mo ago includes increasing intermittent/unpredictable excessive sweating episodes lasting 10-15 minutes. She also feels lightheaded, off balanced when walking away to be alone if in public, has difficulty speaking, slowed thought processing during event and dizziness. States after event occurs, she is extremely tired and wants to sleep to feel better, she is just worn out after the above events and can't do as much the rest of the day after it occurs.   Reports one event when giving blood when she was younger that she fell on floor, had urinary incontinence and bit her tongue. Has had bilateral hand paresthesias for several months, intermittent. Denies seizure hx or family sz hx, no palpitations, angina, frequent falls, vision changes, unexplained weight loss, acute weakness, or medication changes recently. History reviewed. No pertinent past medical history. Past Surgical History:   Procedure Laterality Date    HX APPENDECTOMY      HX OTHER SURGICAL      anal fisure repair     Family History   Problem Relation Age of Onset    Diabetes Mother     Diabetes Father      Social History     Social History    Marital status: SINGLE     Spouse name: N/A    Number of children: N/A    Years of education: N/A     Occupational History    Not on file. Social History Main Topics    Smoking status: Never Smoker    Smokeless tobacco: Never Used    Alcohol use No    Drug use: No    Sexual activity: Not on file     Other Topics Concern    Not on file     Social History Narrative       Objective  Allergies: Allergies   Allergen Reactions    Codeine Nausea Only     Outpatient Meds  No current facility-administered medications on file prior to encounter. Current Outpatient Prescriptions on File Prior to Encounter   Medication Sig Dispense Refill    buPROPion XL (WELLBUTRIN XL) 300 mg XL tablet Take 300 mg by mouth every morning.  PARoxetine (PAXIL) 20 mg tablet Take 20 mg by mouth daily.  spironolactone (ALDACTONE) 100 mg tablet Take 100 mg by mouth daily.  hydrocortisone (PROCTOSOL HC) 2.5 % rectal cream Insert  into rectum four (4) times daily.  hydrocortisone-pramoxine (PROCTOFOAM HC) rectal foam Insert 1 Applicator into rectum two (2) times a day.  1 Can 0       Inpatient Meds    Current Facility-Administered Medications:     0.9% sodium chloride infusion, 75 mL/hr, IntraVENous, CONTINUOUS, Kam Chan MD, Last Rate: 75 mL/hr at 10/20/17 0216, 75 mL/hr at 10/20/17 0216    sodium chloride (NS) flush 5-10 mL, 5-10 mL, IntraVENous, Q8H, Mady Mejias MD, 10 mL at 10/20/17 0217    sodium chloride (NS) flush 5-10 mL, 5-10 mL, IntraVENous, PRN, Mady Mejias MD    acetaminophen (TYLENOL) tablet 650 mg, 650 mg, Oral, Q4H PRN, Mady Mejias MD    oxyCODONE-acetaminophen (PERCOCET) 5-325 mg per tablet 1 Tab, 1 Tab, Oral, Q4H PRN, Mady Mejias MD    HYDROmorphone (PF) (DILAUDID) injection 0.5 mg, 0.5 mg, IntraVENous, Q4H PRN, Mady Mejias MD    prochlorperazine (COMPAZINE) injection 10 mg, 10 mg, IntraVENous, Q6H PRN, Mady Mejias MD    zolpidem [facility]) tablet 5 mg, 5 mg, Oral, QHS PRN, Mady Mejias MD    enoxaparin (LOVENOX) injection 40 mg, 40 mg, SubCUTAneous, Q24H, Mady Mejias MD, 40 mg at 10/20/17 0849    influenza vaccine 2017-18 (3 yrs+)(PF) (FLUZONE QUAD/FLUARIX QUAD) injection 0.5 mL, 0.5 mL, IntraMUSCular, PRIOR TO DISCHARGE, Mady Mejias MD    PHYSICAL EXAM  Patient Vitals for the past 12 hrs:   Temp Pulse Resp BP SpO2   10/20/17 1238 98.2 °F (36.8 °C) 72 18 130/80 97 %   10/20/17 0737 98.2 °F (36.8 °C) 73 18 120/63 95 %   10/20/17 0700 - 80 - - -   10/20/17 0339 - - - 142/80 -   10/20/17 8442 - - - 120/73 -   10/20/17 0335 98.4 °F (36.9 °C) 68 19 115/64 98 %        General: overweight pleasant WF in NAD, providing accurate history    Psych: Affect is calm, cooperative, pleasant   Neck: supple, nontender,  No bruit   Heart: regular rhythm and rate     Lungs: clear BBS   Extremities: no LE edema Skin: no rashes      Neurological Examination:    Mental Status:  Alert, oriented x 4, Good insight and judgement        Commands:  following      Language:  Comprehension: intact   Speech: no dysarthria or aphasia     Cranial Nerves:            I: smell   Not tested    II: visual acuity    deferred    II: visual fields   Full to confrontation    II: pupils   Equal, round, reactive to light    II: optic disc   Not examined    III,VII:   no ptosis of either eyelid III,IV,VI: extraocular muscles    Full EOM, no nystagmus, no intranuclear opthalmoplegia    V: mastication   symmetrical    V: facial sensation:    Equal V1, V2 and V3 bilaterally with LT    VII: facial muscle function     Symmetric, no facial droop    VIII: hearing   Equal bilaterally    IX: soft palate elevation    Uvula midline, elevates symetrically    XI: trapezius strength    5/5    XI: sternocleidomastoid strength   5/5    XI: neck flexion strength    5/5     XII: tongue    Protrudes midline, no fasciculations or atrophy      Strength/Motor   Left:   Proximal:   5 /5     Distal:   5 /5        Right:    Proximal:    5/5     Distal:   5 /5    Drift:       None             Bulk:  appears symmetric            Tone:  normal      Reflexes:    BR Brachial Patellar Achilles Babinski Startle Glabellar   L 2/4+ 2/4+ 2/4+ NT  downgoing NT NT   R 2/4+ 2/4+ 2/4+ NT downgoing NT NT      Sensory: intact on proximal & distal extremity w/ LT, pressure, temp bilaterally   Coordination: FNF: Intact bilat, but dysmetria on L FNF with eyes closed   Heel to shin:  Intact bilaterally    Tremors:  no resting tremors, no intention tremors   Gait: deferred   *ABIMAEL :  Unable to assess due to reduced comprehension, agitation or reduced LOC.     Labs Reviewed  Recent Results (from the past 12 hour(s))   CBC W/O DIFF    Collection Time: 10/20/17  5:35 AM   Result Value Ref Range    WBC 10.8 3.6 - 11.0 K/uL    RBC 4.37 3.80 - 5.20 M/uL    HGB 12.7 11.5 - 16.0 g/dL    HCT 37.9 35.0 - 47.0 %    MCV 86.7 80.0 - 99.0 FL    MCH 29.1 26.0 - 34.0 PG    MCHC 33.5 30.0 - 36.5 g/dL    RDW 12.8 11.5 - 14.5 %    PLATELET 381 644 - 828 K/uL   MAGNESIUM    Collection Time: 10/20/17  5:35 AM   Result Value Ref Range    Magnesium 1.7 1.6 - 2.4 mg/dL   METABOLIC PANEL, COMPREHENSIVE    Collection Time: 10/20/17  5:35 AM   Result Value Ref Range    Sodium 139 136 - 145 mmol/L    Potassium 3.7 3.5 - 5.1 mmol/L    Chloride 106 97 - 108 mmol/L    CO2 27 21 - 32 mmol/L    Anion gap 6 5 - 15 mmol/L    Glucose 95 65 - 100 mg/dL    BUN 8 6 - 20 MG/DL    Creatinine 0.85 0.55 - 1.02 MG/DL    BUN/Creatinine ratio 9 (L) 12 - 20      GFR est AA >60 >60 ml/min/1.73m2    GFR est non-AA >60 >60 ml/min/1.73m2    Calcium 8.3 (L) 8.5 - 10.1 MG/DL    Bilirubin, total 0.3 0.2 - 1.0 MG/DL    ALT (SGPT) 18 12 - 78 U/L    AST (SGOT) 22 15 - 37 U/L    Alk. phosphatase 126 (H) 45 - 117 U/L    Protein, total 6.9 6.4 - 8.2 g/dL    Albumin 3.2 (L) 3.5 - 5.0 g/dL    Globulin 3.7 2.0 - 4.0 g/dL    A-G Ratio 0.9 (L) 1.1 - 2.2     PHOSPHORUS    Collection Time: 10/20/17  5:35 AM   Result Value Ref Range    Phosphorus 2.5 (L) 2.6 - 4.7 MG/DL   TSH 3RD GENERATION    Collection Time: 10/20/17  5:35 AM   Result Value Ref Range    TSH 6.69 (H) 0.36 - 3.74 uIU/mL     Imaging  Reviewed:   CT Results (recent):    Results from [place] encounter on 10/19/17   CT HEAD WO CONT   Narrative EXAM:  CT HEAD WO CONT    INDICATION: Headache following syncope with prolonged period of unconsciousness. COMPARISON: None    TECHNIQUE: Noncontrast head CT. Coronal and sagittal reformats. CT dose  reduction was achieved through use of a standardized protocol tailored for this  examination and automatic exposure control for dose modulation. Adaptive  statistical iterative reconstruction (ASIR) was utilized. FINDINGS: The ventricles and sulci are age-appropriate without hydrocephalus. There is no mass effect or midline shift. There is no intracranial hemorrhage or  extra-axial fluid collection. There is no abnormal parenchymal attenuation. The  gray-white matter differentiation is maintained. The basal cisterns are patent. The osseous structures are intact. The visualized paranasal sinuses and mastoid  air cells are clear. Impression IMPRESSION:     No acute intracranial abnormality on this noncontrast head CT.         MRI Results (recent):  No results found for this or any previous visit.    _____________________________________________________________________    Review of Systems: 10 point ROS was performed. Pertinent positives listed in HPI. Denies:  balance difficulties, angina, palpitations, weakness, vision loss, slurred speech, aphasia, fever, chills, falls, diplopia, back pain, neck pain, prior episodes of vertigo, hallucinations, new medications or dosage changes. _____________________________________________________________________  Impression  39 y.o. female with onset of syncope. Exam:  Non-focal other than mild L FNF dysmetria with eyes closed. Imaging reviewed: CT head without abnormal finding. Labs : stable. With patient having increased frequency of profuse diaphoresis/excessive sweating, off balance gait, lightheadedness, difficulty communicating, slower processing for 10-15 minutes followed by extreme fatigue that makes her want to sleep; this has components of a potential neurologic or cardiologic issue. Refer to plan below. Assessment:  1. Non-motor Focal Seizure vs. Syncope  2. Excessive sweating events with confusion-- increasing in frequency  3. Frontal HA-- bilateral-- improved  4. Paresthesias bilateral fingertips-- intermittent    Plan  · Testing:  EEG and MRI brain, will await results  · Seizure precautions. No need for AED now. May be DISCHARGED post MRI if:  EEG results don't show abnormal findings and MRI brain doesn't note abnormal findings   and if patient doesn't have an abnormal event that may appear to be an atypical seizure while still inpatient    ·   Continue great care by collaborating care team and nursing staff. ·  Testing discussed with patient --- any questions answered. My collaborating care team physician may have further recommendations.     On DVT Prophylaxis yes no   Continue Lovenox while inpatient x      Care Plan discussed with:  Patient x   Family    RN x   Care Manager    Consultant/Specialist:  Dr. Ines Epps x Patient will be discussed with Dr. Saintclair Lemme  ______________________________________________________________  Hospital Problems  Date Reviewed: 10/20/2017          Codes Class Noted POA    Acquired hypothyroidism ICD-10-CM: E03.9  ICD-9-CM: 244.9  10/20/2017 Yes    Overview Signed 10/20/2017  2:57 PM by Diann Choi NP     -- TSH- thyroid elevated at 6.69 (if elevated it means level is in the hypothyroid level and need this evaluated when outpatient further--  norm level is 0.36-3.7)             * (Principal)Blackout ICD-10-CM: R55  ICD-9-CM: 780.2  10/19/2017 Yes        Leukocytosis ICD-10-CM: U24.783  ICD-9-CM: 288.60  10/19/2017 Yes        Obesity (BMI 30-39.9) (Chronic) ICD-10-CM: D02.3  ICD-9-CM: 278.00  10/19/2017 Yes              *Thank you for allowing [facility], to participate in the care of your patient.     ---HARSHIL Suarez  ================================================    ADDENDUM--> Collaborating Care Team Physician:

## 2017-10-20 NOTE — DISCHARGE INSTRUCTIONS
Patient Discharge Instructions    nAgie Ureña / [id number] : 1980    Admitted 10/19/2017 Discharged: 10/20/2017     Primary Diagnoses  Problem List as of 10/20/2017  Date Reviewed: 10/20/2017          Codes Class Noted - Resolved   * (Principal)Blackout   Leukocytosis   Obesity (BMI 30-39.9) (Chronic)          Take Home Medications     · It is important that you take the medication exactly as they are prescribed. · Keep your medication in the bottles provided by the pharmacist and keep a list of the medication names, dosages, and times to be taken in your wallet. · Do not take other medications without consulting your doctor. What to do at Home    Recommended diet: Regular Diet    Recommended activity: Activity as tolerated    If you experience worse symptoms, please follow up with your PCP, cardiology, neurology. Follow-up with your PCP in a few weeks      Information obtained by :  I understand that if any problems occur once I am at home I am to contact my physician. I understand and acknowledge receipt of the instructions indicated above.                                                                                                                                            Physician's or R.N.'s Signature                                                                  Date/Time                                                                                                                                              Patient or Representative Signature                                                          Date/Time

## 2017-10-24 ENCOUNTER — OFFICE VISIT (OUTPATIENT)
Dept: ENDOCRINOLOGY | Age: 37
End: 2017-10-24

## 2017-10-24 VITALS
BODY MASS INDEX: 31.61 KG/M2 | HEART RATE: 71 BPM | RESPIRATION RATE: 14 BRPM | DIASTOLIC BLOOD PRESSURE: 82 MMHG | TEMPERATURE: 97.6 F | HEIGHT: 69 IN | WEIGHT: 213.4 LBS | SYSTOLIC BLOOD PRESSURE: 141 MMHG | OXYGEN SATURATION: 99 %

## 2017-10-24 DIAGNOSIS — E03.9 ACQUIRED HYPOTHYROIDISM: Primary | ICD-10-CM

## 2017-10-24 DIAGNOSIS — R53.82 CHRONIC FATIGUE: ICD-10-CM

## 2017-10-24 DIAGNOSIS — R06.83 SNORING: ICD-10-CM

## 2017-10-24 DIAGNOSIS — E28.2 PCOS (POLYCYSTIC OVARIAN SYNDROME): ICD-10-CM

## 2017-10-24 RX ORDER — LEVOTHYROXINE SODIUM 25 UG/1
25 TABLET ORAL
Qty: 30 TAB | Refills: 5 | Status: SHIPPED | OUTPATIENT
Start: 2017-10-24 | End: 2018-03-20

## 2017-10-24 RX ORDER — METFORMIN HYDROCHLORIDE 750 MG/1
750 TABLET, EXTENDED RELEASE ORAL DAILY
Qty: 30 TAB | Refills: 4 | Status: SHIPPED | OUTPATIENT
Start: 2017-10-24 | End: 2018-03-20

## 2017-10-24 NOTE — PROGRESS NOTES
Claudine Erazo is a 39 y.o. female here for   Chief Complaint   Patient presents with    New Patient     self referred for Thyroid       1. Have you been to the ER, urgent care clinic since your last visit? Hospitalized since your last visit? -n/a    2. Have you seen or consulted any other health care providers outside of the [address] [place] [address] since your last visit?   Include any pap smears or colon screening.-n/a    Wt Readings from Last 3 Encounters:   10/19/17 212 lb 11.9 oz (96.5 kg)   09/01/16 206 lb 2.1 oz (93.5 kg)   05/25/16 197 lb 12 oz (89.7 kg)     Temp Readings from Last 3 Encounters:   10/20/17 98.2 °F (36.8 °C)   09/01/16 98.1 °F (36.7 °C)   05/25/16 96.4 °F (35.8 °C)     BP Readings from Last 3 Encounters:   10/20/17 125/81   09/01/16 113/61   05/25/16 144/74     Pulse Readings from Last 3 Encounters:   10/20/17 75   09/01/16 79   05/25/16 81

## 2017-10-24 NOTE — PROGRESS NOTES
Sami Wells MD        [address] [phone] Fax [phone]             Patient Information  Date:10/24/2017  Name : Arley Goodell 39 y.o.     YOB: 1980         Referred by: None         History of present illness    Arley Goodell is a 39 y.o. female  here for evaluation of thyroid. she was diagnosed with Subclinical  Hypothyroidism which was monitored  PCOS - on aldactone, irregular cycles   Was never on metformin     Reports weight gain and inability to lose weight , fatigue, snoring +  Diet is healthy    Sleeps well   TSH is >6    No change in the size of the neck or neck pain. No dysphagia,dysphonia or dyspnea. FH of thyroid disease. No FH of thyroid cancer     Wt Readings from Last 3 Encounters:   10/24/17 213 lb 6.4 oz (96.8 kg)   10/19/17 212 lb 11.9 oz (96.5 kg)   09/01/16 206 lb 2.1 oz (93.5 kg)       History reviewed. No pertinent past medical history. Current Outpatient Prescriptions   Medication Sig    buPROPion XL (WELLBUTRIN XL) 300 mg XL tablet Take 300 mg by mouth every morning.  PARoxetine (PAXIL) 20 mg tablet Take 20 mg by mouth daily.  spironolactone (ALDACTONE) 100 mg tablet Take 100 mg by mouth daily.  levothyroxine (SYNTHROID) 25 mcg tablet Take 1 Tab by mouth Daily (before breakfast).  hydrocortisone (PROCTOSOL HC) 2.5 % rectal cream Insert  into rectum four (4) times daily.  hydrocortisone-pramoxine (PROCTOFOAM HC) rectal foam Insert 1 Applicator into rectum two (2) times a day. No current facility-administered medications for this visit. Allergies   Allergen Reactions    Codeine Nausea Only         Review of Systems:  All 10 systems  reviewed and are negative other than mentioned in HPI    Physical Examination:  Blood pressure 141/82, pulse 71, temperature 97.6 °F (36.4 °C), temperature source Oral, resp.  rate 14, height 5' 9\" (1.753 m), weight 213 lb 6.4 oz (96.8 kg), last menstrual period 09/14/2017, SpO2 99 %. - General: pleasant, no distress, good eye contact  - HEENT: no exopthalmos, no periorbital edema, no scleral/conjunctival injection, EOMI, no lid lag or stare  - Neck: supple, no thyromegaly, no nodules,no lymphadenopathy  - Cardiovascular: regular, normal rate, normal S1 and S2,  - Respiratory: clear to auscultation bilaterally  - Gastrointestinal: soft, nontender, nondistended, BS +  - Musculoskeletal: no proximal muscle weakness in upper or lower extremities  - Integumentary: no tremors, no edema,  - Neurological:alert and oriented   - Psychiatric: normal mood and affect    Data Reviewed:     [] Reviewed labs      Assessment/Plan:     1. Acquired hypothyroidism    2. PCOS (polycystic ovarian syndrome)    3. Chronic fatigue    4. Snoring        Subclinical  hypothyroidism   Discussed the natural course of hypothyroidism. she has a slightly elevated TSH level . There is no strong data to suggest  it is truly beneficial to start patients on thyroxine when their TSH level is < 10 . If she plans to get pregnant then needs treatment . PCOS /Fatigue  Rule out WHITNEY, life style changes discussed, work up for excess cortisol  Discussed standard Tx options for PCOS   Metformin helps regulating the cycles,weight loss and most importantly metabolic risk, the importance of exercise. OCPS in combination with spironolactone helps with hirsuitism. There are no Patient Instructions on file for this visit. Follow-up Disposition:  Return in about 6 weeks (around 12/5/2017).         Patient verbalized understanding

## 2017-10-24 NOTE — MR AVS SNAPSHOT
Visit Information Date & Time Provider Department Dept. Phone Encounter #  
 10/24/2017  3:00 PM Dann Lovelace MD [facility] [phone] [id number] Follow-up Instructions Return in about 6 weeks (around 12/5/2017). Your Appointments 11/6/2017 10:00 AM  
New Patient with Edward Forte MD  
[address] ([facility]) Appt Note: 2 wk hosp fup per dr p holter 10:30  
 [address] [place] [address] [place] [address] [facility] [address]  
[phone]  
  
   
 [address] [place] [address] [facility] [address]  
  
    
 11/6/2017 10:30 AM  
HOLTER MONITOR with HOLTER, STFRANCIS  
[facility] ([facility] SCHEDUL[facility]) Appt Note: 2 wk hosp fup per dr p holter 10:30  
 [address] [facility] [place] [address] [facility]  
54 [facility] [address] [address] Upcoming Health Maintenance Date Due Pneumococcal 19-64 Highest Risk (1 of 3 - PCV13) 11/16/1999 DTaP/Tdap/Td series (1 - Tdap) 11/16/2001 PAP AKA CERVICAL CYTOLOGY 11/16/2001 Allergies as of 10/24/2017  Review Complete On: 10/24/2017 By: Dann Lovelace MD  
  
 Severity Noted Reaction Type Reactions Codeine Low 06/24/2014   Side Effect Nausea Only Current Immunizations  Never Reviewed Name Date Influenza Vaccine (Quad) PF 10/20/2017  5:52 PM  
  
 Not reviewed this visit You Were Diagnosed With   
  
 Codes Comments Acquired hypothyroidism    -  Primary ICD-10-CM: E03.9 ICD-9-CM: 374. 9 Vitals BP Pulse Temp Resp Height(growth percentile) Weight(growth percentile) 141/82 (BP 1 Location: Left arm, BP Patient Position: Sitting) 71 97.6 °F (36.4 °C) (Oral) 14 5' 9\" (1.753 m) 213 lb 6.4 oz (96.8 kg) LMP SpO2 BMI OB Status Smoking Status 09/14/2017 99% 31.51 kg/m2 Having regular periods Never Smoker Vitals History BMI and BSA Data Body Mass Index Body Surface Area  
 31.51 kg/m 2 2.17 m 2 Preferred Pharmacy Pharmacy Name Phone [id number] [phone] Your Updated Medication List  
  
   
This list is accurate as of: 10/24/17  4:18 PM.  Always use your most recent med list.  
  
  
  
  
 buPROPion  mg XL tablet Commonly known as:  Kathlean Hurtado Take 300 mg by mouth every morning. hydrocortisone-pramoxine rectal foam  
Commonly known as:  PROCTOFOAM HC Insert 1 Applicator into rectum two (2) times a day. PARoxetine 20 mg tablet Commonly known as:  PAXIL Take 20 mg by mouth daily. PROCTOSOL HC 2.5 % rectal cream  
Generic drug:  hydrocortisone Insert  into rectum four (4) times daily. spironolactone 100 mg tablet Commonly known as:  ALDACTONE Take 100 mg by mouth daily. Follow-up Instructions Return in about 6 weeks (around 12/5/2017). Introducing [facility]! Dear [address]: Thank you for requesting a NMRKT account. Our records indicate that you already have an active NMRKT account. You can access your account anytime at [email or web address] Did you know that you can access your hospital and ER discharge instructions at any time in NMRKT? You can also review all of your test results from your hospital stay or ER visit. Additional Information If you have questions, please visit the Frequently Asked Questions section of the NMRKT website at [email or web address] Remember, NMRKT is NOT to be used for urgent needs. For medical emergencies, dial 911. Now available from your iPhone and Android! Please provide this summary of care documentation to your next provider. Your primary care clinician is listed as NONE. If you have any questions after today's visit, please call [phone].

## 2017-11-06 ENCOUNTER — OFFICE VISIT (OUTPATIENT)
Dept: CARDIOLOGY CLINIC | Age: 37
End: 2017-11-06

## 2017-11-06 ENCOUNTER — CLINICAL SUPPORT (OUTPATIENT)
Dept: CARDIOLOGY CLINIC | Age: 37
End: 2017-11-06

## 2017-11-06 ENCOUNTER — TELEPHONE (OUTPATIENT)
Dept: CARDIOLOGY CLINIC | Age: 37
End: 2017-11-06

## 2017-11-06 VITALS
OXYGEN SATURATION: 97 % | HEART RATE: 72 BPM | DIASTOLIC BLOOD PRESSURE: 62 MMHG | HEIGHT: 69 IN | WEIGHT: 213.8 LBS | RESPIRATION RATE: 16 BRPM | BODY MASS INDEX: 31.67 KG/M2 | SYSTOLIC BLOOD PRESSURE: 118 MMHG

## 2017-11-06 DIAGNOSIS — R55 BLACKOUT: ICD-10-CM

## 2017-11-06 DIAGNOSIS — R42 DIZZINESS: Primary | ICD-10-CM

## 2017-11-06 DIAGNOSIS — R61 DIAPHORESIS: ICD-10-CM

## 2017-11-06 DIAGNOSIS — R55 SYNCOPE, UNSPECIFIED SYNCOPE TYPE: ICD-10-CM

## 2017-11-06 NOTE — LETTER
11/6/2017 1:42 PM 
 
Patient:  Selma Acosta YOB: 1980 Date of Visit: 11/6/2017 Dear Dr. Estuardo Gurrola, [address] [place] [address] VIA Facsimile: [phone] 
 : Thank you for referring Ms. Selma Acosta to me for evaluation/treatment. Below are the relevant portions of my assessment and plan of care. Assessment: 
History of syncope 10/19/17 [facility] admit. Neuro workup negative. History of dizziness/diaphoresis History of hypothyroidism/PCO S/depression Plan:  
Patient symptoms could be secondary to orthostasis. However she is not orthostatic in the office today. E cardio event monitor Echocardiogram . Monitor blood pressure/heart rate May need tilt table test. 
Advised to wear compression stockings. Keep hydrated. If you have questions, please do not hesitate to call me. I look forward to following Ms. Diallo along with you. Sincerely, Alyssa Salter MD

## 2017-11-06 NOTE — MR AVS SNAPSHOT
Visit Information Date & Time Provider Department Dept. Phone Encounter #  
 11/6/2017 10:00 AM Miriam Kenny MD [facility] [phone] [id number] Your Appointments 11/28/2017  8:30 AM  
LAB with CDE NURSE [facility]) Appt Note: labs [address] [place] [address] [facility] [address]  
[phone]  
  
   
 [address] [address]  
  
    
 12/5/2017  8:45 AM  
ROUTINE CARE with Kenrick Heck MD  
[facility] Appt Note: 6 week fu  
 [address] [facility] [address] [phone]  
  
   
 [address] [place] [address] Upcoming Health Maintenance Date Due Pneumococcal 19-64 Highest Risk (1 of 3 - PCV13) 11/16/1999 DTaP/Tdap/Td series (1 - Tdap) 11/16/2001 PAP AKA CERVICAL CYTOLOGY 11/16/2001 Allergies as of 11/6/2017  Review Complete On: 11/6/2017 By: Ap Bowling RN Severity Noted Reaction Type Reactions Codeine Low 06/24/2014   Side Effect Nausea Only Current Immunizations  Never Reviewed Name Date Influenza Vaccine (Quad) PF 10/20/2017  5:52 PM  
  
 Not reviewed this visit Vitals BP Pulse Resp Height(growth percentile) Weight(growth percentile) LMP  
 118/62 (BP 1 Location: Left arm, BP Patient Position: Supine) 72 16 5' 9\" (1.753 m) 213 lb 12.8 oz (97 kg) 09/14/2017 SpO2 BMI OB Status Smoking Status 97% 31.57 kg/m2 Having regular periods Never Smoker Vitals History BMI and BSA Data Body Mass Index Body Surface Area  
 31.57 kg/m 2 2.17 m 2 Preferred Pharmacy Pharmacy Name Phone [id number] [place], [address] [facility] [phone] Your Updated Medication List  
  
   
This list is accurate as of: 11/6/17 11:01 AM.  Always use your most recent med list.  
  
  
  
  
 buPROPion  mg XL tablet Commonly known as:  Brigitte Suha Take 300 mg by mouth every morning. hydrocortisone-pramoxine rectal foam  
Commonly known as:  PROCTOFOAM HC Insert 1 Applicator into rectum two (2) times a day. levothyroxine 25 mcg tablet Commonly known as:  SYNTHROID Take 1 Tab by mouth Daily (before breakfast). metFORMIN  mg tablet Commonly known as:  GLUCOPHAGE XR Take 1 Tab by mouth daily. With food  Indications: Polycystic Ovarian Syndrome PARoxetine 20 mg tablet Commonly known as:  PAXIL Take 20 mg by mouth daily. PROCTOSOL HC 2.5 % rectal cream  
Generic drug:  hydrocortisone Insert  into rectum four (4) times daily. spironolactone 100 mg tablet Commonly known as:  ALDACTONE Take 100 mg by mouth daily. Introducing [facility]! Dear Elizabeth Hubbard: Thank you for requesting a Incuron account. Our records indicate that you already have an active Incuron account. You can access your account anytime at [email or web address] Did you know that you can access your hospital and ER discharge instructions at any time in Incuron? You can also review all of your test results from your hospital stay or ER visit. Additional Information If you have questions, please visit the Frequently Asked Questions section of the Incuron website at [email or web address] Remember, Incuron is NOT to be used for urgent needs. For medical emergencies, dial 911. Now available from your iPhone and Android! Please provide this summary of care documentation to your next provider. Your primary care clinician is listed as Kiana Lynn. If you have any questions after today's visit, please call [phone].

## 2017-11-06 NOTE — PROGRESS NOTES
Chief Complaint   Patient presents with   [facility] Follow Up       Visit Vitals    /84 (BP 1 Location: Left arm, BP Patient Position: Sitting)    Pulse 78    Resp 16    Ht 5' 9\" (1.753 m)    Wt 213 lb 12.8 oz (97 kg)    SpO2 97%    BMI 31.57 kg/m2     Extended / Orthostatic Vitals:  Patient Position 2: Sitting  BP 2: 126/72  Pulse 2: 76  Patient Position 3: Standing  BP 3: 124/72  Pulse 3: 80      Pt presents in office today with intermittent SOB both at rest and with activity. Pt occasionally gets diaphoretic and disoriented. No episodes since hospital visit.

## 2017-11-06 NOTE — PROGRESS NOTES
Savanah Ma MD    [address] [place], [address] [facility]    Office [phone],BSB [phone]  Pager [phone]    Lorri Lopez is a 39 y.o. female referred for evaluation of sycnope. Consult requested by Wilfred Estrella DO      Primary care physician:  Wilfred Estrella DO      Chief complaint:  Lorri Lopez is a 39 y.o. female who complains of   Chief Complaint   Patient presents with   Jessie Rene     Dear Dr Ramiro Klein,    I had the pleasure of seeing Ms Lorri Lopez in the office today. Assessment:  History of syncope 10/19/17 [facility] admit. Neuro workup negative. History of dizziness/diaphoresis  History of hypothyroidism/PCO S/depression      Plan:   Patient symptoms could be secondary to orthostasis. However she is not orthostatic in the office today. E cardio event monitor  Echocardiogram .    Monitor blood pressure/heart rate  May need tilt table test.  Advised to wear compression stockings. Keep hydrated. Follow-up in 6-8 weeks or earlier as needed. Patient understands the plan. All questions were answered to the patient's satisfaction. Medication Side Effects and Warnings were discussed with patient: yes  Patient Labs were reviewed and or requested:  yes  Patient Past Records were reviewed and or requested: yes    I appreciate the opportunity to be involved in Ms. Stack. Please see note below for details. Please do not hesitate to contact us with questions or concerns. Savanah Ma MD    Cardiac Testing/ Procedures: A. Cardiac Cath/PCI:    B.ECHO/PORTIA:    C.StressNuclear/Stress ECHO/Stress test:    D.Vascular:    E. EP:    F. Miscellaneous:    History of present illness:    Patient is a 27-year-old  female who was admitted to [facility] on 10/19/17 for syncope. Patient reports that she has been having episodes of dizziness, diaphoresis for the past 2 years.   Symptoms occur usually once or twice a month. It usually occurs when she is standing. She states that it is not due to hypoglycemia because it has happened when she has just eaten. On the day of admission to [facility], patient got up to get a glass of water and she remembers feeling dizzy. When she woke up she was on the floor. She was confused and disoriented. She stated that she must have been out for almost 45 minutes before coming around. This is the first time she has passed out since her episodes started occurring approximately 2 years ago. Neurological workup was negative including MRI. The neurologist had asked her to follow with cardiology to rule out cardiac etiology. No chest pain. No history of syncope during childhood. No family history of sudden cardiac death. Since discharge from the hospital, the symptoms have not recurred. Patient denies anxiety/panic attacks as the cause of her symptoms. Past Medical History:   Diagnosis Date    Syncope 10/19/2017      Past Surgical History:   Procedure Laterality Date    HX APPENDECTOMY      HX OTHER SURGICAL      anal fisure repair     Family History   Problem Relation Age of Onset    Diabetes Mother     Diabetes Father       Social History   Substance Use Topics    Smoking status: Never Smoker    Smokeless tobacco: Never Used    Alcohol use No          Medications before admission:    Current Outpatient Prescriptions   Medication Sig Dispense    levothyroxine (SYNTHROID) 25 mcg tablet Take 1 Tab by mouth Daily (before breakfast). (Patient taking differently: Take 50 mcg by mouth Daily (before breakfast). ) 30 Tab    buPROPion XL (WELLBUTRIN XL) 300 mg XL tablet Take 300 mg by mouth every morning.  PARoxetine (PAXIL) 20 mg tablet Take 20 mg by mouth daily.  spironolactone (ALDACTONE) 100 mg tablet Take 100 mg by mouth daily.  metFORMIN ER (GLUCOPHAGE XR) 750 mg tablet Take 1 Tab by mouth daily.  With food  Indications: Polycystic Ovarian Syndrome 30 Tab    hydrocortisone (PROCTOSOL HC) 2.5 % rectal cream Insert  into rectum four (4) times daily.  hydrocortisone-pramoxine (PROCTOFOAM HC) rectal foam Insert 1 Applicator into rectum two (2) times a day. 1 Can     No current facility-administered medications for this visit. Review of Systems:  (bold if positive, if negative)    Gen:  Eyes:  ENT:  CVS:  Pulm:  GI:    :    MS:  Skin:  Psych:  Endo:    Hem:  Renal:    Neuro:        Physical Exam:  Visit Vitals    /62 (BP 1 Location: Left arm, BP Patient Position: Supine)    Pulse 72    Resp 16    Ht 5' 9\" (1.753 m)    Wt 213 lb 12.8 oz (97 kg)    LMP 09/14/2017    SpO2 97%    BMI 31.57 kg/m2      Extended / Orthostatic Vitals:  Patient Position 2: Sitting  BP 2: 126/72  Pulse 2: 76  Patient Position 3: Standing  BP 3: 124/72  Pulse 3: 80    Gen: Well-developed, well-nourished, in no acute distress  HEENT:  Pink conjunctivae, hearing intact to voice, moist mucous membranes  Neck: Supple,No JVD, No Carotid Bruit, Thyroid- non tender  Resp: No accessory muscle use, Clear breath sounds, No rales or rhonchi  Card: Regular Rate,Rythm,Normal S1, S2, No murmurs, rubs or gallop. No thrills. Abd:  Soft, non-tender, non-distended, normoactive bowel sounds are present,   MSK: No cyanosis or clubbing  Skin: No rashes or ulcers  Neuro:   moving all four extremities, no focal deficit, follows commands appropriately  Psych:  Good insight, oriented to person, place and time, alert, Nml Affect  LE: No edema  Vascular: Radial Pulses 2+ and symmetric        EKG: Reviewed 10/19/17-normal sinus rhythm/normal intervals      Cxray:    LABS:    No results for input(s): CPK, TROIQ in the last 72 hours.     No lab exists for component: CKQMB, CPKMB    Lab Results   Component Value Date/Time    WBC 10.8 10/20/2017 05:35 AM    HGB 12.7 10/20/2017 05:35 AM    HCT 37.9 10/20/2017 05:35 AM    PLATELET 987 55/04/6662 05:35 AM    MCV 86.7 10/20/2017 05:35 AM     Lab Results Component Value Date/Time    Sodium 139 10/20/2017 05:35 AM    Potassium 3.7 10/20/2017 05:35 AM    Chloride 106 10/20/2017 05:35 AM    CO2 27 10/20/2017 05:35 AM    Anion gap 6 10/20/2017 05:35 AM    Glucose 95 10/20/2017 05:35 AM    BUN 8 10/20/2017 05:35 AM    Creatinine 0.85 10/20/2017 05:35 AM    BUN/Creatinine ratio 9 10/20/2017 05:35 AM    GFR est AA >60 10/20/2017 05:35 AM    GFR est non-AA >60 10/20/2017 05:35 AM    Calcium 8.3 10/20/2017 05:35 AM             Rajinder Alfredo MD

## 2017-11-15 ENCOUNTER — CLINICAL SUPPORT (OUTPATIENT)
Dept: CARDIOLOGY CLINIC | Age: 37
End: 2017-11-15

## 2017-11-15 DIAGNOSIS — R55 SYNCOPE, UNSPECIFIED SYNCOPE TYPE: Primary | ICD-10-CM

## 2017-11-15 DIAGNOSIS — I07.1 TRICUSPID VALVE INSUFFICIENCY, UNSPECIFIED ETIOLOGY: ICD-10-CM

## 2018-03-20 ENCOUNTER — APPOINTMENT (OUTPATIENT)
Dept: CT IMAGING | Age: 38
End: 2018-03-20
Attending: NURSE PRACTITIONER
Payer: COMMERCIAL

## 2018-03-20 ENCOUNTER — HOSPITAL ENCOUNTER (EMERGENCY)
Age: 38
Discharge: HOME | End: 2018-03-20
Attending: EMERGENCY MEDICINE
Payer: COMMERCIAL

## 2018-03-20 VITALS
BODY MASS INDEX: 32.91 KG/M2 | DIASTOLIC BLOOD PRESSURE: 79 MMHG | RESPIRATION RATE: 18 BRPM | OXYGEN SATURATION: 97 % | HEART RATE: 72 BPM | TEMPERATURE: 97.9 F | SYSTOLIC BLOOD PRESSURE: 125 MMHG | WEIGHT: 222.88 LBS

## 2018-03-20 DIAGNOSIS — R10.32 ABDOMINAL PAIN, LLQ (LEFT LOWER QUADRANT): Primary | ICD-10-CM

## 2018-03-20 DIAGNOSIS — K57.90 DIVERTICULOSIS OF INTESTINE WITHOUT BLEEDING, UNSPECIFIED INTESTINAL TRACT LOCATION: ICD-10-CM

## 2018-03-20 LAB
ALBUMIN SERPL-MCNC: 3.8 G/DL (ref 3.5–5)
ALBUMIN/GLOB SERPL: 1 {RATIO} (ref 1.1–2.2)
ALP SERPL-CCNC: 119 U/L (ref 45–117)
ALT SERPL-CCNC: 24 U/L (ref 12–78)
ANION GAP SERPL CALC-SCNC: 9 MMOL/L (ref 5–15)
APPEARANCE UR: CLEAR
AST SERPL-CCNC: 32 U/L (ref 15–37)
BACTERIA URNS QL MICRO: NEGATIVE /HPF
BASOPHILS # BLD: 0 K/UL (ref 0–0.1)
BASOPHILS NFR BLD: 0 % (ref 0–1)
BILIRUB SERPL-MCNC: 0.4 MG/DL (ref 0.2–1)
BILIRUB UR QL: NEGATIVE
BUN SERPL-MCNC: 9 MG/DL (ref 6–20)
BUN/CREAT SERPL: 10 (ref 12–20)
CALCIUM SERPL-MCNC: 8.9 MG/DL (ref 8.5–10.1)
CHLORIDE SERPL-SCNC: 105 MMOL/L (ref 97–108)
CO2 SERPL-SCNC: 27 MMOL/L (ref 21–32)
COLOR UR: ABNORMAL
CREAT SERPL-MCNC: 0.87 MG/DL (ref 0.55–1.02)
DIFFERENTIAL METHOD BLD: ABNORMAL
EOSINOPHIL # BLD: 0.2 K/UL (ref 0–0.4)
EOSINOPHIL NFR BLD: 2 % (ref 0–7)
EPITH CASTS URNS QL MICRO: ABNORMAL /LPF
ERYTHROCYTE [DISTWIDTH] IN BLOOD BY AUTOMATED COUNT: 12.7 % (ref 11.5–14.5)
GLOBULIN SER CALC-MCNC: 4 G/DL (ref 2–4)
GLUCOSE SERPL-MCNC: 88 MG/DL (ref 65–100)
GLUCOSE UR STRIP.AUTO-MCNC: NEGATIVE MG/DL
HCG UR QL: NEGATIVE
HCT VFR BLD AUTO: 39 % (ref 35–47)
HGB BLD-MCNC: 12.8 G/DL (ref 11.5–16)
HGB UR QL STRIP: ABNORMAL
KETONES UR QL STRIP.AUTO: NEGATIVE MG/DL
LEUKOCYTE ESTERASE UR QL STRIP.AUTO: NEGATIVE
LIPASE SERPL-CCNC: 126 U/L (ref 73–393)
LYMPHOCYTES # BLD: 3.2 K/UL (ref 0.8–3.5)
LYMPHOCYTES NFR BLD: 30 % (ref 12–49)
MCH RBC QN AUTO: 28.6 PG (ref 26–34)
MCHC RBC AUTO-ENTMCNC: 32.8 G/DL (ref 30–36.5)
MCV RBC AUTO: 87.2 FL (ref 80–99)
MONOCYTES # BLD: 0.7 K/UL (ref 0–1)
MONOCYTES NFR BLD: 6 % (ref 5–13)
NEUTS SEG # BLD: 6.8 K/UL (ref 1.8–8)
NEUTS SEG NFR BLD: 62 % (ref 32–75)
NITRITE UR QL STRIP.AUTO: NEGATIVE
PH UR STRIP: 7 [PH] (ref 5–8)
PLATELET # BLD AUTO: 429 K/UL (ref 150–400)
PMV BLD AUTO: 10.5 FL (ref 8.9–12.9)
POTASSIUM SERPL-SCNC: 3.5 MMOL/L (ref 3.5–5.1)
PROT SERPL-MCNC: 7.8 G/DL (ref 6.4–8.2)
PROT UR STRIP-MCNC: NEGATIVE MG/DL
RBC # BLD AUTO: 4.47 M/UL (ref 3.8–5.2)
RBC #/AREA URNS HPF: ABNORMAL /HPF (ref 0–5)
SODIUM SERPL-SCNC: 141 MMOL/L (ref 136–145)
SP GR UR REFRACTOMETRY: 1.01 (ref 1–1.03)
UR CULT HOLD, URHOLD: NORMAL
UROBILINOGEN UR QL STRIP.AUTO: 0.2 EU/DL (ref 0.2–1)
WBC # BLD AUTO: 10.9 K/UL (ref 3.6–11)
WBC URNS QL MICRO: ABNORMAL /HPF (ref 0–4)
XXWBCSUS: 0

## 2018-03-20 PROCEDURE — 74011250636 HC RX REV CODE- 250/636: Performed by: NURSE PRACTITIONER

## 2018-03-20 PROCEDURE — 99283 EMERGENCY DEPT VISIT LOW MDM: CPT

## 2018-03-20 PROCEDURE — 36415 COLL VENOUS BLD VENIPUNCTURE: CPT | Performed by: EMERGENCY MEDICINE

## 2018-03-20 PROCEDURE — 81001 URINALYSIS AUTO W/SCOPE: CPT | Performed by: EMERGENCY MEDICINE

## 2018-03-20 PROCEDURE — 85025 COMPLETE CBC W/AUTO DIFF WBC: CPT | Performed by: EMERGENCY MEDICINE

## 2018-03-20 PROCEDURE — 96361 HYDRATE IV INFUSION ADD-ON: CPT

## 2018-03-20 PROCEDURE — 81025 URINE PREGNANCY TEST: CPT

## 2018-03-20 PROCEDURE — 74177 CT ABD & PELVIS W/CONTRAST: CPT

## 2018-03-20 PROCEDURE — 80053 COMPREHEN METABOLIC PANEL: CPT | Performed by: EMERGENCY MEDICINE

## 2018-03-20 PROCEDURE — 96375 TX/PRO/DX INJ NEW DRUG ADDON: CPT

## 2018-03-20 PROCEDURE — 96374 THER/PROPH/DIAG INJ IV PUSH: CPT

## 2018-03-20 PROCEDURE — 83690 ASSAY OF LIPASE: CPT | Performed by: NURSE PRACTITIONER

## 2018-03-20 PROCEDURE — 74011636320 HC RX REV CODE- 636/320: Performed by: EMERGENCY MEDICINE

## 2018-03-20 RX ORDER — ONDANSETRON 2 MG/ML
4 INJECTION INTRAMUSCULAR; INTRAVENOUS
Status: COMPLETED | OUTPATIENT
Start: 2018-03-20 | End: 2018-03-20

## 2018-03-20 RX ORDER — KETOROLAC TROMETHAMINE 30 MG/ML
30 INJECTION, SOLUTION INTRAMUSCULAR; INTRAVENOUS
Status: COMPLETED | OUTPATIENT
Start: 2018-03-20 | End: 2018-03-20

## 2018-03-20 RX ORDER — POLYETHYLENE GLYCOL 3350 17 G/17G
17 POWDER, FOR SOLUTION ORAL DAILY
Qty: 119 G | Refills: 0 | Status: SHIPPED | OUTPATIENT
Start: 2018-03-20 | End: 2018-10-07

## 2018-03-20 RX ADMIN — IOPAMIDOL 100 ML: 755 INJECTION, SOLUTION INTRAVENOUS at 18:28

## 2018-03-20 RX ADMIN — ONDANSETRON 4 MG: 2 INJECTION INTRAMUSCULAR; INTRAVENOUS at 18:02

## 2018-03-20 RX ADMIN — KETOROLAC TROMETHAMINE 30 MG: 30 INJECTION, SOLUTION INTRAMUSCULAR; INTRAVENOUS at 18:03

## 2018-03-20 RX ADMIN — SODIUM CHLORIDE 1000 ML: 900 INJECTION, SOLUTION INTRAVENOUS at 17:57

## 2018-03-20 NOTE — ED NOTES
Odell Simons in lab to inquire about Lipase order. He stated that it would begin running in a moment.

## 2018-03-20 NOTE — ED NOTES
Pt resting on stretcher in no apparent distress. She reports that her pain has lessened after medication administration. IV fluids completed. Toileting offered. Pt has no needs, awaiting imaging results. Call bell in reach.

## 2018-03-20 NOTE — DISCHARGE INSTRUCTIONS
Diverticulosis: Care Instructions  Your Care Instructions  In diverticulosis, pouches called diverticula form in the wall of the large intestine (colon). The pouches do not cause any pain or other symptoms. Most people who have diverticulosis do not know they have it. But the pouches sometimes bleed, and if they become infected, they can cause pain and other symptoms. When this happens, it is called diverticulitis. Diverticula form when pressure pushes the wall of the colon outward at certain weak points. A diet that is too low in fiber can cause diverticula. Follow-up care is a key part of your treatment and safety. Be sure to make and go to all appointments, and call your doctor if you are having problems. It's also a good idea to know your test results and keep a list of the medicines you take. How can you care for yourself at home? · Include fruits, leafy green vegetables, beans, and whole grains in your diet each day. These foods are high in fiber. · Take a fiber supplement, such as Citrucel or Metamucil, every day if needed. Read and follow all instructions on the label. · Drink plenty of fluids, enough so that your urine is light yellow or clear like water. If you have kidney, heart, or liver disease and have to limit fluids, talk with your doctor before you increase the amount of fluids you drink. · Get at least 30 minutes of exercise on most days of the week. Walking is a good choice. You also may want to do other activities, such as running, swimming, cycling, or playing tennis or team sports. · Cut out foods that cause gas, pain, or other symptoms. When should you call for help? Call your doctor now or seek immediate medical care if:  ? · You have belly pain. ? · You pass maroon or very bloody stools. ? · You have a fever. ? · You have nausea and vomiting. ? · You have unusual changes in your bowel movements or abdominal swelling. ? · You have burning pain when you urinate.    ? · You have abnormal vaginal discharge. ? · You have shoulder pain. ? · You have cramping pain that does not get better when you have a bowel movement or pass gas. ? · You pass gas or stool from your urethra while urinating. ? Watch closely for changes in your health, and be sure to contact your doctor if you have any problems. Where can you learn more? Go to http://chadwick-dimas.info/. Enter R686 in the search box to learn more about \"Diverticulosis: Care Instructions. \"  Current as of: May 12, 2017  Content Version: 11.4  © 8828-8310 [employer]. Care instructions adapted under license by [employer] (which disclaims liability or warranty for this information). If you have questions about a medical condition or this instruction, always ask your healthcare professional. [address] any warranty or liability for your use of this information. Abdominal Pain: Care Instructions  Your Care Instructions    Abdominal pain has many possible causes. Some aren't serious and get better on their own in a few days. Others need more testing and treatment. If your pain continues or gets worse, you need to be rechecked and may need more tests to find out what is wrong. You may need surgery to correct the problem. Don't ignore new symptoms, such as fever, nausea and vomiting, urination problems, pain that gets worse, and dizziness. These may be signs of a more serious problem. Your doctor may have recommended a follow-up visit in the next 8 to 12 hours. If you are not getting better, you may need more tests or treatment. The doctor has checked you carefully, but problems can develop later. If you notice any problems or new symptoms, get medical treatment right away. Follow-up care is a key part of your treatment and safety. Be sure to make and go to all appointments, and call your doctor if you are having problems.  It's also a good idea to know your test results and keep a list of the medicines you take. How can you care for yourself at home? · Rest until you feel better. · To prevent dehydration, drink plenty of fluids, enough so that your urine is light yellow or clear like water. Choose water and other caffeine-free clear liquids until you feel better. If you have kidney, heart, or liver disease and have to limit fluids, talk with your doctor before you increase the amount of fluids you drink. · If your stomach is upset, eat mild foods, such as rice, dry toast or crackers, bananas, and applesauce. Try eating several small meals instead of two or three large ones. · Wait until 48 hours after all symptoms have gone away before you have spicy foods, alcohol, and drinks that contain caffeine. · Do not eat foods that are high in fat. · Avoid anti-inflammatory medicines such as aspirin, ibuprofen (Advil, Motrin), and naproxen (Aleve). These can cause stomach upset. Talk to your doctor if you take daily aspirin for another health problem. When should you call for help? Call 911 anytime you think you may need emergency care. For example, call if:  ? · You passed out (lost consciousness). ? · You pass maroon or very bloody stools. ? · You vomit blood or what looks like coffee grounds. ? · You have new, severe belly pain. ?Call your doctor now or seek immediate medical care if:  ? · Your pain gets worse, especially if it becomes focused in one area of your belly. ? · You have a new or higher fever. ? · Your stools are black and look like tar, or they have streaks of blood. ? · You have unexpected vaginal bleeding. ? · You have symptoms of a urinary tract infection. These may include:  ¨ Pain when you urinate. ¨ Urinating more often than usual.  ¨ Blood in your urine. ? · You are dizzy or lightheaded, or you feel like you may faint. ? Watch closely for changes in your health, and be sure to contact your doctor if:  ? · You are not getting better after 1 day (24 hours). Where can you learn more? Go to http://chadwick-dimas.info/. Enter L720 in the search box to learn more about \"Abdominal Pain: Care Instructions. \"  Current as of: March 20, 2017  Content Version: 11.4  © 7218-1734 [employer]. Care instructions adapted under license by "[employer]" (which disclaims liability or warranty for this information). If you have questions about a medical condition or this instruction, always ask your healthcare professional. [address] any warranty or liability for your use of this information. Constipation: Care Instructions  Your Care Instructions    Constipation means that you have a hard time passing stools (bowel movements). People pass stools from 3 times a day to once every 3 days. What is normal for you may be different. Constipation may occur with pain in the rectum and cramping. The pain may get worse when you try to pass stools. Sometimes there are small amounts of bright red blood on toilet paper or the surface of stools. This is because of enlarged veins near the rectum (hemorrhoids). A few changes in your diet and lifestyle may help you avoid ongoing constipation. Your doctor may also prescribe medicine to help loosen your stool. Some medicines can cause constipation. These include pain medicines and antidepressants. Tell your doctor about all the medicines you take. Your doctor may want to make a medicine change to ease your symptoms. Follow-up care is a key part of your treatment and safety. Be sure to make and go to all appointments, and call your doctor if you are having problems. It's also a good idea to know your test results and keep a list of the medicines you take. How can you care for yourself at home? · Drink plenty of fluids, enough so that your urine is light yellow or clear like water.  If you have kidney, heart, or liver disease and have to limit fluids, talk with your doctor before you increase the amount of fluids you drink. · Include high-fiber foods in your diet each day. These include fruits, vegetables, beans, and whole grains. · Get at least 30 minutes of exercise on most days of the week. Walking is a good choice. You also may want to do other activities, such as running, swimming, cycling, or playing tennis or team sports. · Take a fiber supplement, such as Citrucel or Metamucil, every day. Read and follow all instructions on the label. · Schedule time each day for a bowel movement. A daily routine may help. Take your time having your bowel movement. · Support your feet with a small step stool when you sit on the toilet. This helps flex your hips and places your pelvis in a squatting position. · Your doctor may recommend an over-the-counter laxative to relieve your constipation. Examples are Milk of Magnesia and MiraLax. Read and follow all instructions on the label. Do not use laxatives on a long-term basis. When should you call for help? Call your doctor now or seek immediate medical care if:  ? · You have new or worse belly pain. ? · You have new or worse nausea or vomiting. ? · You have blood in your stools. ? Watch closely for changes in your health, and be sure to contact your doctor if:  ? · Your constipation is getting worse. ? · You do not get better as expected. Where can you learn more? Go to http://chadwick-dimas.info/. Enter [id number] in the search box to learn more about \"Constipation: Care Instructions. \"  Current as of: March 20, 2017  Content Version: 11.4  © 4367-7445 [employer]. Care instructions adapted under license by [employer] (which disclaims liability or warranty for this information).  If you have questions about a medical condition or this instruction, always ask your healthcare professional. [place] [address] any warranty or liability for your use of this information.

## 2018-03-20 NOTE — ED TRIAGE NOTES
Pt ambulatory to treatment area with c/o \"severe left abdominal pain that started yesterday. \"  Pt reports \"loose stool\" and states \"I have the feeling like I haven't finished when I have a BM. \"  Pt denies fevers, vomiting, urinary symptoms.

## 2018-03-20 NOTE — ED NOTES
Pt resting on stretcher in no distress. IV fluids infusing to gravity. Warm blanket provided for comfort. Call bell in reach.

## 2018-03-20 NOTE — ED NOTES
[facility] communication provided and informed of purposeful rounding to include collaboration of entire care team; patient acknowledged understanding. Pt sitting on stretcher talking to triage nurse. Assessment completed. Awaiting provider. IV access obtained, lab work collected and sent to laboratory. Updated pt on time factors regarding lab and imaging results. Call bell in reach.

## 2018-03-20 NOTE — ED NOTES
The patient was discharged home by Bianka Mccabe NP in stable condition. The patient is alert and oriented, in no respiratory distress and discharge vital signs obtained. The patient's diagnosis, condition and treatment were explained. The patient expressed understanding. One prescription given. No work/school note given. A discharge plan has been developed. A  was not involved in the process. Aftercare instructions were given. Pt ambulatory out of the ED.

## 2018-03-20 NOTE — ED PROVIDER NOTES
HPI Comments: 40 y.o. female with past medical history significant for appendectomy who presents from home with chief complaint of abdominal pain. She states that her pain started yesterday night, describes it as constant 6/10 pain that is intermittently sharp. Reports that she feels like she has to defecate. She will attempt to have a BM, and only a small amount of diarrhea will come out. Denies bloody stool. She states that if she leans forward or ambulates her pain is worse. Had some nausea last night but denies it at present. Also reports some decreased appetite. She states that her mother has diverticulitis and that is a concern of hers. Denies concern over STIs. There are no other acute medical concerns at this time. Denies fever, chest pain, shortness of breath, vomiting,  symptoms, pelvic pain, pelvic discharge, dyspareunia   Social hx: denies smoking, denies ETOH. LMP 2/5/18  Significant FMHx: diverticulitis   PCP: Sara Banerjee DO        Patient is a 40 y.o. female presenting with abdominal pain. The history is provided by the patient. Abdominal Pain    Associated symptoms include diarrhea. Pertinent negatives include no fever, no vomiting and no dysuria. Past Medical History:   Diagnosis Date    Syncope 10/19/2017       Past Surgical History:   Procedure Laterality Date    HX APPENDECTOMY      HX OTHER SURGICAL      anal fisure repair         Family History:   Problem Relation Age of Onset    Diabetes Mother     Diabetes Father        Social History     Social History    Marital status:      Spouse name: N/A    Number of children: N/A    Years of education: N/A     Occupational History    Not on file.      Social History Main Topics    Smoking status: Never Smoker    Smokeless tobacco: Never Used    Alcohol use No    Drug use: No    Sexual activity: Not on file     Other Topics Concern    Not on file     Social History Narrative         ALLERGIES: Codeine    Review of Systems   Constitutional: Positive for appetite change. Negative for fever. HENT: Negative for facial swelling. Eyes: Negative for visual disturbance. Respiratory: Negative for shortness of breath. Gastrointestinal: Positive for abdominal pain and diarrhea. Negative for blood in stool and vomiting. Genitourinary: Negative for difficulty urinating, dyspareunia, dysuria, pelvic pain and vaginal discharge. Musculoskeletal: Negative for gait problem. Skin: Negative for rash. Neurological: Negative for dizziness. Psychiatric/Behavioral: Negative for confusion and decreased concentration. All other systems reviewed and are negative. Vitals:    03/20/18 1728   BP: (!) 156/96   Pulse: 94   Resp: 18   Temp: 97.7 °F (36.5 °C)   SpO2: 98%   Weight: 101.1 kg (222 lb 14.2 oz)            Physical Exam   Constitutional: She is oriented to person, place, and time. She appears well-developed and well-nourished. No distress. HENT:   Head: Normocephalic and atraumatic. Right Ear: External ear normal.   Left Ear: External ear normal.   Nose: Nose normal.   Eyes: Conjunctivae and EOM are normal. Pupils are equal, round, and reactive to light. Neck: Normal range of motion. Neck supple. No thyromegaly present. Cardiovascular: Normal rate, regular rhythm, normal heart sounds and intact distal pulses. No murmur heard. Pulmonary/Chest: Effort normal and breath sounds normal. No respiratory distress. She exhibits no tenderness. Abdominal: Soft. Bowel sounds are normal. She exhibits no distension and no mass. There is no hepatosplenomegaly. There is tenderness in the left lower quadrant. There is no rigidity, no guarding and no CVA tenderness. No peritoneal signs    Musculoskeletal: Normal range of motion. She exhibits no edema or deformity. Lymphadenopathy:     She has no cervical adenopathy. Neurological: She is alert and oriented to person, place, and time. Skin: Skin is warm and dry.  No rash noted. Psychiatric: She has a normal mood and affect. Her behavior is normal. Judgment and thought content normal.   Nursing note and vitals reviewed. MDM  Number of Diagnoses or Management Options  Abdominal pain, LLQ (left lower quadrant):   Diverticulosis of intestine without bleeding, unspecified intestinal tract location:   Diagnosis management comments: The patient presents with abdominal pain with a differential diagnosis of diverticulitis, obstruction, ovarian cyst, ectopic pregnancy, UTI and constipation    Abdomen is soft but tender along the LLQ. Non-toxic appearing, vital signs reassuring  CBC, CMP, lipase, UA, UPT, CT abdomen/pelvis, analgesics, antiemetics, IV fluids    Plan: Discharge with follow-up to GI and primary care provider         ED Course   7:33 PM  CT unremarkable for acute findings, has mild sigmoid diverticulitis. Reviewed CT with MD, has fecal stasis presents. Labs and UA unremarkable and reassuring. Patient is well appearing, non-toxic with normal vital signs  Jenelle Ramos NP    Discussed patient and diagnostic results with attending MELVI Wilson MD. No further diagnostic work-up warranted at this time. Will discharge patient with miralax. Offered analgesics and she said she can get them OTC. All questions answered. Patient instructed to take miralax, increase fluid intake, follow-up with GI and PCP. Return to the ER for any concerning or worsening symptoms. She verbalized understanding.  All questions answered   Jenelle Ramos NP      Procedures

## 2018-10-07 ENCOUNTER — HOSPITAL ENCOUNTER (EMERGENCY)
Age: 38
Discharge: HOME | End: 2018-10-07
Attending: EMERGENCY MEDICINE
Payer: COMMERCIAL

## 2018-10-07 ENCOUNTER — APPOINTMENT (OUTPATIENT)
Dept: GENERAL RADIOLOGY | Age: 38
End: 2018-10-07
Attending: EMERGENCY MEDICINE
Payer: COMMERCIAL

## 2018-10-07 ENCOUNTER — APPOINTMENT (OUTPATIENT)
Dept: CT IMAGING | Age: 38
End: 2018-10-07
Attending: EMERGENCY MEDICINE
Payer: COMMERCIAL

## 2018-10-07 VITALS
SYSTOLIC BLOOD PRESSURE: 133 MMHG | WEIGHT: 221.34 LBS | RESPIRATION RATE: 18 BRPM | DIASTOLIC BLOOD PRESSURE: 84 MMHG | BODY MASS INDEX: 32.78 KG/M2 | TEMPERATURE: 98.6 F | HEIGHT: 69 IN | HEART RATE: 64 BPM | OXYGEN SATURATION: 98 %

## 2018-10-07 DIAGNOSIS — S00.33XA CONTUSION OF NOSE, INITIAL ENCOUNTER: ICD-10-CM

## 2018-10-07 DIAGNOSIS — S09.90XA MINOR HEAD INJURY, INITIAL ENCOUNTER: ICD-10-CM

## 2018-10-07 DIAGNOSIS — M25.562 ACUTE PAIN OF LEFT KNEE: ICD-10-CM

## 2018-10-07 DIAGNOSIS — S80.02XA CONTUSION OF LEFT KNEE, INITIAL ENCOUNTER: Primary | ICD-10-CM

## 2018-10-07 PROCEDURE — 74011250637 HC RX REV CODE- 250/637: Performed by: EMERGENCY MEDICINE

## 2018-10-07 PROCEDURE — 99283 EMERGENCY DEPT VISIT LOW MDM: CPT

## 2018-10-07 PROCEDURE — 70450 CT HEAD/BRAIN W/O DYE: CPT

## 2018-10-07 PROCEDURE — 70486 CT MAXILLOFACIAL W/O DYE: CPT

## 2018-10-07 PROCEDURE — 73562 X-RAY EXAM OF KNEE 3: CPT

## 2018-10-07 RX ORDER — ONDANSETRON 4 MG/1
4 TABLET, ORALLY DISINTEGRATING ORAL
Status: COMPLETED | OUTPATIENT
Start: 2018-10-07 | End: 2018-10-07

## 2018-10-07 RX ORDER — IBUPROFEN 600 MG/1
600 TABLET ORAL
Status: COMPLETED | OUTPATIENT
Start: 2018-10-07 | End: 2018-10-07

## 2018-10-07 RX ORDER — IBUPROFEN 200 MG
600 TABLET ORAL
Status: SHIPPED | COMMUNITY
Start: 2018-10-07 | End: 2019-04-23

## 2018-10-07 RX ADMIN — ONDANSETRON 4 MG: 4 TABLET, ORALLY DISINTEGRATING ORAL at 22:18

## 2018-10-07 RX ADMIN — IBUPROFEN 600 MG: 600 TABLET, FILM COATED ORAL at 23:09

## 2018-10-08 NOTE — ED PROVIDER NOTES
HPI Comments: Rainer Mathur is a 39 yo F with who tripped while stepping over the threshold of her dog gate and fell forward landing on her left knee and the middle of her face. She did not pass out but felt \"dazed\" after her fall. She has blurry vision and nausea that has since resolved. She also had a nose bleed that has now stopped. The injury occurred around 8:30pm tonight. Patient denies neck and back pain. Past Medical History:  
Diagnosis Date  Hypertension  Syncope 10/19/2017 Past Surgical History:  
Procedure Laterality Date  HX APPENDECTOMY  HX OTHER SURGICAL    
 anal fisure repair Family History:  
Problem Relation Age of Onset  Diabetes Mother  Diabetes Father Social History Social History  Marital status:  Spouse name: N/A  
 Number of children: N/A  
 Years of education: N/A Occupational History  Not on file. Social History Main Topics  Smoking status: Never Smoker  Smokeless tobacco: Never Used  Alcohol use No  
 Drug use: No  
 Sexual activity: Not on file Other Topics Concern  Not on file Social History Narrative ALLERGIES: Codeine Review of Systems Constitutional: Negative for fever. HENT: Positive for nosebleeds. Eyes: Negative for visual disturbance. Respiratory: Negative for cough. Cardiovascular: Negative for chest pain. Gastrointestinal: Positive for nausea. Negative for abdominal pain. Genitourinary: Negative for dysuria. Musculoskeletal: Negative for back pain. Left knee pain Skin: Negative for rash. Neurological: Positive for headaches. Vitals:  
 10/07/18 2207 10/07/18 2316 BP: (!) 143/92 133/84 Pulse: 68 64 Resp: 18 18 Temp: 98.6 °F (37 °C) SpO2: 98% 98% Weight: 100.4 kg (221 lb 5.5 oz) Height: 5' 9\" (1.753 m) Physical Exam  
Constitutional: She appears well-developed and well-nourished. No distress. HENT:  
Head: Normocephalic and atraumatic. Nose: No nasal deformity or nasal septal hematoma. Right sinus exhibits maxillary sinus tenderness. Left sinus exhibits maxillary sinus tenderness. Mouth/Throat: Oropharynx is clear and moist.  
Eyes: Conjunctivae and EOM are normal.  
Neck: Normal range of motion and phonation normal. Neck supple. Cardiovascular: Normal rate and intact distal pulses. Pulmonary/Chest: Effort normal. No respiratory distress. Abdominal: She exhibits no distension. Musculoskeletal: Normal range of motion. Left knee: She exhibits no ecchymosis and no erythema. Tenderness found. Medial joint line tenderness noted. Neurological: She is alert. She is not disoriented. She exhibits normal muscle tone. Skin: Skin is warm and dry. Nursing note and vitals reviewed. MDM 
 
 
ED Course Procedures

## 2018-10-08 NOTE — DISCHARGE INSTRUCTIONS
Knee Pain or Injury: Care Instructions  Your Care Instructions    Injuries are a common cause of knee problems. Sudden (acute) injuries may be caused by a direct blow to the knee. They can also be caused by abnormal twisting, bending, or falling on the knee. Pain, bruising, or swelling may be severe, and may start within minutes of the injury. Overuse is another cause of knee pain. Other causes are climbing stairs, kneeling, and other activities that use the knee. Everyday wear and tear, especially as you get older, also can cause knee pain. Rest, along with home treatment, often relieves pain and allows your knee to heal. If you have a serious knee injury, you may need tests and treatment. Follow-up care is a key part of your treatment and safety. Be sure to make and go to all appointments, and call your doctor if you are having problems. It's also a good idea to know your test results and keep a list of the medicines you take. How can you care for yourself at home? · Be safe with medicines. Read and follow all instructions on the label. ¨ If the doctor gave you a prescription medicine for pain, take it as prescribed. ¨ If you are not taking a prescription pain medicine, ask your doctor if you can take an over-the-counter medicine. · Rest and protect your knee. Take a break from any activity that may cause pain. · Put ice or a cold pack on your knee for 10 to 20 minutes at a time. Put a thin cloth between the ice and your skin. · Prop up a sore knee on a pillow when you ice it or anytime you sit or lie down for the next 3 days. Try to keep it above the level of your heart. This will help reduce swelling. · If your knee is not swollen, you can put moist heat, a heating pad, or a warm cloth on your knee. · If your doctor recommends an elastic bandage, sleeve, or other type of support for your knee, wear it as directed.   · Follow your doctor's instructions about how much weight you can put on your leg. Use a cane, crutches, or a walker as instructed. · Follow your doctor's instructions about activity during your healing process. If you can do mild exercise, slowly increase your activity. · Reach and stay at a healthy weight. Extra weight can strain the joints, especially the knees and hips, and make the pain worse. Losing even a few pounds may help. When should you call for help? Call 911 anytime you think you may need emergency care. For example, call if:    · You have symptoms of a blood clot in your lung (called a pulmonary embolism). These may include:  ¨ Sudden chest pain. ¨ Trouble breathing. ¨ Coughing up blood.    Call your doctor now or seek immediate medical care if:    · You have severe or increasing pain.     · Your leg or foot turns cold or changes color.     · You cannot stand or put weight on your knee.     · Your knee looks twisted or bent out of shape.     · You cannot move your knee.     · You have signs of infection, such as:  ¨ Increased pain, swelling, warmth, or redness. ¨ Red streaks leading from the knee. ¨ Pus draining from a place on your knee. ¨ A fever.     · You have signs of a blood clot in your leg (called a deep vein thrombosis), such as:  ¨ Pain in your calf, back of the knee, thigh, or groin. ¨ Redness and swelling in your leg or groin.    Watch closely for changes in your health, and be sure to contact your doctor if:    · You have tingling, weakness, or numbness in your knee.     · You have any new symptoms, such as swelling.     · You have bruises from a knee injury that last longer than 2 weeks.     · You do not get better as expected. Where can you learn more? Go to http://chadwick-dimas.info/. Enter K195 in the search box to learn more about \"Knee Pain or Injury: Care Instructions. \"  Current as of: November 20, 2017  Content Version: 11.8  © 3001-9213 Healthwise, [employer].  Care instructions adapted under license by [facility] (which disclaims liability or warranty for this information). If you have questions about a medical condition or this instruction, always ask your healthcare professional. [address] any warranty or liability for your use of this information. Bruised Nose: Care Instructions  Your Care Instructions  You can get a bruised nose if you fall or if something hits your nose. The medical term for a bruise is \"contusion. \" Small blood vessels get torn and leak blood under the skin. Most people think of a bruise as a black-and-blue spot. But bones and muscles can also get bruised. This may damage deep tissues but not cause a bruise you can see. Your doctor will examine you and will gently press on your nose and face to find areas that are tender. He or she will check your eyes, how well you can move the muscles near the bruise, and your feeling around the area to make sure there isn't a more serious injury, such as a broken bone or nerve damage. You may have tests, including X-rays or other imaging tests like a CT scan. Sometimes it can be hard to tell if a nose is broken or just bruised. The symptoms may be the same. And a broken bone can't always be seen on an X-ray. But the treatment for a bruised nose is often the same as for a broken nose. A bruised nose may cause pain and swelling of the nose and face. But if there is no other damage, it will usually get better in a few weeks with home treatment. Follow-up care is a key part of your treatment and safety. Be sure to make and go to all appointments, and call your doctor if you are having problems. It's also a good idea to know your test results and keep a list of the medicines you take. How can you care for yourself at home? · Put ice or a cold pack on your nose for 10 to 20 minutes at a time. Put a thin cloth between the ice pack and your skin.  Try to do this every 1 to 2 hours for the first 3 days (when you are awake) or until the swelling goes down. · Sleep with your head slightly raised until the swelling goes down. Prop up your head and shoulders on pillows. · If you play contact sports, ask your doctor when it's okay to play again. It's safest to wait at least 6 weeks. · Be safe with medicines. Read and follow all instructions on the label. ¨ If the doctor gave you a prescription medicine for pain, take it as prescribed. ¨ If you are not taking a prescription pain medicine, ask your doctor if you can take an over-the-counter medicine. When should you call for help? Call your doctor now or seek immediate medical care if:    · Your pain gets worse.     · You have new or worse swelling.     · You have new or worse bleeding.     · The area near the bruise is tingly, weak, or numb.     · You have vision changes.     · You have clear fluid draining from your nose.    Watch closely for changes in your health, and be sure to contact your doctor if:    · You do not get better as expected. Where can you learn more? Go to http://chadwick-dimas.info/. Enter S111 in the search box to learn more about \"Bruised Nose: Care Instructions. \"  Current as of: November 20, 2017  Content Version: 11.8  © 0501-7512 [employer]. Care instructions adapted under license by [employer] (which disclaims liability or warranty for this information). If you have questions about a medical condition or this instruction, always ask your healthcare professional. [place] [address] any warranty or liability for your use of this information. Head Injury: Care Instructions  Your Care Instructions    Most injuries to the head are minor. Bumps, cuts, and scrapes on the head and face usually heal well and can be treated the same as injuries to other parts of the body. Although it's rare, once in a while a more serious problem shows up after you are home.  So it's good to be on the lookout for symptoms for a day or two. Follow-up care is a key part of your treatment and safety. Be sure to make and go to all appointments, and call your doctor if you are having problems. It's also a good idea to know your test results and keep a list of the medicines you take. How can you care for yourself at home? · Follow your doctor's instructions. He or she will tell you if you need someone to watch you closely for the next 24 hours or longer. · Take it easy for the next few days or more if you are not feeling well. · Ask your doctor when it's okay for you to go back to activities like driving a car, riding a bike, or operating machinery. When should you call for help? Call 911 anytime you think you may need emergency care. For example, call if:    · You have a seizure.     · You passed out (lost consciousness).     · You are confused or can't stay awake.    Call your doctor now or seek immediate medical care if:    · You have new or worse vomiting.     · You feel less alert.     · You have new weakness or numbness in any part of your body.    Watch closely for changes in your health, and be sure to contact your doctor if:    · You do not get better as expected.     · You have new symptoms, such as headaches, trouble concentrating, or changes in mood. Where can you learn more? Go to http://chadwick-dimas.info/. Enter O828 in the search box to learn more about \"Head Injury: Care Instructions. \"  Current as of: June 4, 2018  Content Version: 11.8  © 6682-9069 Healthwise, Incorporated. Care instructions adapted under license by [employer] (which disclaims liability or warranty for this information). If you have questions about a medical condition or this instruction, always ask your healthcare professional. [address] any warranty or liability for your use of this information.

## 2018-10-08 NOTE — ED NOTES
Patient discharged home after receiving discharge instructions from MD.  Patient and wife voiced understanding and doesn't have any questions at this time. Patient in no distress at this time. Pt ambulated out of the ER with wife. Ace wrap secured to left knee. No nausea

## 2018-10-08 NOTE — ED TRIAGE NOTES
Was walking through a dog gate, feet got caught and fell on left knee and face. Nose was bleeding, but it has stopped. No LOC. \"Was a hard hit. \" wife witnessed fall. Feels \"dazed. \"  Blurry vision after it happened, but that has resolved. Did feel nauseated, but not now. This happened around 2030 tonight

## 2019-04-23 ENCOUNTER — APPOINTMENT (OUTPATIENT)
Dept: ULTRASOUND IMAGING | Age: 39
End: 2019-04-23
Attending: EMERGENCY MEDICINE
Payer: COMMERCIAL

## 2019-04-23 ENCOUNTER — HOSPITAL ENCOUNTER (EMERGENCY)
Age: 39
Discharge: HOME | End: 2019-04-24
Attending: EMERGENCY MEDICINE
Payer: COMMERCIAL

## 2019-04-23 VITALS
WEIGHT: 232.37 LBS | DIASTOLIC BLOOD PRESSURE: 82 MMHG | TEMPERATURE: 98.6 F | RESPIRATION RATE: 18 BRPM | HEART RATE: 90 BPM | SYSTOLIC BLOOD PRESSURE: 157 MMHG | BODY MASS INDEX: 34.31 KG/M2 | OXYGEN SATURATION: 98 %

## 2019-04-23 DIAGNOSIS — M79.89 RIGHT LEG SWELLING: Primary | ICD-10-CM

## 2019-04-23 PROCEDURE — 99282 EMERGENCY DEPT VISIT SF MDM: CPT

## 2019-04-23 PROCEDURE — 93971 EXTREMITY STUDY: CPT

## 2019-04-23 RX ORDER — NAPROXEN 250 MG/1
500 TABLET ORAL
Status: COMPLETED | OUTPATIENT
Start: 2019-04-23 | End: 2019-04-24

## 2019-04-24 PROCEDURE — 74011250637 HC RX REV CODE- 250/637: Performed by: EMERGENCY MEDICINE

## 2019-04-24 RX ADMIN — NAPROXEN 500 MG: 250 TABLET ORAL at 00:00

## 2019-04-24 NOTE — ED PROVIDER NOTES
Pt has had right lower leg swelling x 2 weeks with pain behind her right knee cap. Pt denies trauma or long travel. The history is provided by the patient. No  was used. Knee Pain This is a new problem. The current episode started more than 1 week ago. The problem occurs daily. The problem has been gradually worsening. The pain is present in the right knee, right lower leg and right ankle. The quality of the pain is described as aching. The pain is at a severity of 6/10. The pain is moderate. Pertinent negatives include no numbness, full range of motion, no stiffness, no tingling, no itching, no back pain and no neck pain. The symptoms are aggravated by activity and standing. She has tried nothing for the symptoms. There has been no history of extremity trauma. Past Medical History:  
Diagnosis Date  Hypertension  Syncope 10/19/2017 Past Surgical History:  
Procedure Laterality Date  HX APPENDECTOMY  HX OTHER SURGICAL    
 anal fisure repair Family History:  
Problem Relation Age of Onset  Diabetes Mother  Diabetes Father Social History Socioeconomic History  Marital status:  Spouse name: Not on file  Number of children: Not on file  Years of education: Not on file  Highest education level: Not on file Occupational History  Not on file Social Needs  Financial resource strain: Not on file  Food insecurity:  
  Worry: Not on file Inability: Not on file  Transportation needs:  
  Medical: Not on file Non-medical: Not on file Tobacco Use  Smoking status: Never Smoker  Smokeless tobacco: Never Used Substance and Sexual Activity  Alcohol use: No  
 Drug use: No  
 Sexual activity: Not on file Lifestyle  Physical activity:  
  Days per week: Not on file Minutes per session: Not on file  Stress: Not on file Relationships  Social connections: Talks on phone: Not on file Gets together: Not on file Attends Yarsanism service: Not on file Active member of club or organization: Not on file Attends meetings of clubs or organizations: Not on file Relationship status: Not on file  Intimate partner violence:  
  Fear of current or ex partner: Not on file Emotionally abused: Not on file Physically abused: Not on file Forced sexual activity: Not on file Other Topics Concern  Not on file Social History Narrative  Not on file ALLERGIES: Codeine Review of Systems Constitutional: Negative for appetite change, chills, fever and unexpected weight change. HENT: Negative for ear pain, hearing loss, rhinorrhea and trouble swallowing. Eyes: Negative for pain and visual disturbance. Respiratory: Negative for cough, chest tightness and shortness of breath. Cardiovascular: Positive for leg swelling (right). Negative for chest pain and palpitations. Gastrointestinal: Negative for abdominal distention, abdominal pain, blood in stool and vomiting. Genitourinary: Negative for dysuria, hematuria and urgency. Musculoskeletal: Positive for arthralgias. Negative for back pain, myalgias, neck pain and stiffness. Skin: Negative for itching and rash. Neurological: Negative for dizziness, tingling, syncope, weakness and numbness. Psychiatric/Behavioral: Negative for confusion and suicidal ideas. All other systems reviewed and are negative. Vitals:  
 04/23/19 2330 BP: 157/82 Pulse: 90 Resp: 18 Temp: 98.6 °F (37 °C) SpO2: 98% Weight: 105.4 kg (232 lb 5.8 oz) Physical Exam  
Constitutional: She is oriented to person, place, and time. She appears well-developed and well-nourished. No distress. HENT:  
Head: Normocephalic and atraumatic.   
Right Ear: External ear normal.  
Left Ear: External ear normal.  
Nose: Nose normal.  
 Mouth/Throat: Oropharynx is clear and moist. No oropharyngeal exudate. Eyes: Pupils are equal, round, and reactive to light. Conjunctivae and EOM are normal. Right eye exhibits no discharge. Left eye exhibits no discharge. No scleral icterus. Neck: Normal range of motion. Neck supple. No JVD present. No tracheal deviation present. Cardiovascular: Normal rate, regular rhythm, normal heart sounds and intact distal pulses. Exam reveals no gallop and no friction rub. No murmur heard. Pulmonary/Chest: Effort normal and breath sounds normal. No stridor. No respiratory distress. She has no decreased breath sounds. She has no wheezes. She has no rhonchi. She has no rales. She exhibits no tenderness. Abdominal: Soft. Bowel sounds are normal. She exhibits no distension. There is no tenderness. There is no rebound and no guarding. Musculoskeletal: Normal range of motion. She exhibits edema (trace right lower extremity swelling/edema compared to left). She exhibits no tenderness. Neurological: She is alert and oriented to person, place, and time. She has normal strength and normal reflexes. She displays normal reflexes. No cranial nerve deficit or sensory deficit. She exhibits normal muscle tone. Coordination normal. GCS eye subscore is 4. GCS verbal subscore is 5. GCS motor subscore is 6. Skin: Skin is warm and dry. Capillary refill takes less than 2 seconds. No rash noted. She is not diaphoretic. No erythema. No pallor. Psychiatric: She has a normal mood and affect. Her behavior is normal. Judgment and thought content normal.  
Nursing note and vitals reviewed. MDM Number of Diagnoses or Management Options Right leg swelling:  
  
Amount and/or Complexity of Data Reviewed Tests in the radiology section of CPT®: ordered and reviewed Risk of Complications, Morbidity, and/or Mortality Presenting problems: moderate Diagnostic procedures: moderate Management options: moderate Patient Progress Patient progress: stable Procedures Chief Complaint Patient presents with  Knee Pain  Ankle swelling The patient's presenting problems have been discussed, and they are in agreement with the care plan formulated and outlined with them. I have encouraged them to ask questions as they arise throughout their visit. MEDICATIONS GIVEN: 
Medications  
naproxen (NAPROSYN) tablet 500 mg (500 mg Oral Given 4/24/19 0000) LABS REVIEWED: 
No results found for this or any previous visit (from the past 24 hour(s)). VITAL SIGNS: 
Patient Vitals for the past 12 hrs: 
 Temp Pulse Resp BP SpO2  
04/23/19 2330 98.6 °F (37 °C) 90 18 157/82 98 % RADIOLOGY RESULTS: 
The following have been ordered and reviewed: 
No results found. PROGRESS NOTES: 
Doppler for DVT is negative. Discussed results and plan with patient. Patient will be discharged home with PCP follow up. Patient instructed to return to the emergency room for any worsening symptoms or any other concerns. DIAGNOSIS: 
 
1. Right leg swelling PLAN: 
Follow-up Information Follow up With Specialties Details Why Contact Info  
 your doctor  Schedule an appointment as soon as possible for a visit [facility] EMERGENCY DEPT Emergency Medicine  If symptoms worsen [address] [facility] 
[phone] There are no discharge medications for this patient. ED COURSE: The patient's hospital course has been uncomplicated.

## 2019-04-24 NOTE — DISCHARGE INSTRUCTIONS
We hope that we have addressed all of your medical concerns. The examination and treatment you received in the Emergency Department were for an emergent problem and were not intended as complete care. It is important that you follow up with your healthcare provider(s) for ongoing care. If your symptoms worsen or do not improve as expected, and you are unable to reach your usual health care provider(s), you should return to the Emergency Department. Today's healthcare is undergoing tremendous change, and patient satisfaction surveys are one of the many tools to assess the quality of medical care. You may receive a survey from the [employer] organization regarding your experience in the Emergency Department. I hope that your experience has been completely positive, particularly the medical care that I provided. As such, please participate in the survey; anything less than excellent does not meet my expectations or intentions. Formerly [facility] and [facility] participate in nationally recognized quality of care measures. If your blood pressure is greater than 120/80, as reported below, we urge that you seek medical care to address the potential of high blood pressure, commonly known as hypertension. Hypertension can be hereditary or can be caused by certain medical conditions, pain, stress, or \"white coat syndrome. \"       Please make an appointment with your health care provider(s) for follow up of your Emergency Department visit. VITALS:   Patient Vitals for the past 8 hrs:   Temp Pulse Resp BP SpO2   04/23/19 2330 98.6 °F (37 °C) 90 18 157/82 98 %          Thank you for allowing us to provide you with medical care today. We realize that you have many choices for your emergency care needs. Please choose us in the future for any continued health care needs. Sudhir Irving, [address]. Office: [phone]            No results found for this or any previous visit (from the past 24 hour(s)). No results found.

## 2020-02-19 ENCOUNTER — HOSPITAL ENCOUNTER (EMERGENCY)
Age: 40
Discharge: HOME | End: 2020-02-19
Attending: EMERGENCY MEDICINE
Payer: COMMERCIAL

## 2020-02-19 ENCOUNTER — APPOINTMENT (OUTPATIENT)
Dept: CT IMAGING | Age: 40
End: 2020-02-19
Attending: EMERGENCY MEDICINE
Payer: COMMERCIAL

## 2020-02-19 VITALS
HEART RATE: 92 BPM | SYSTOLIC BLOOD PRESSURE: 149 MMHG | TEMPERATURE: 98.2 F | OXYGEN SATURATION: 98 % | DIASTOLIC BLOOD PRESSURE: 82 MMHG | RESPIRATION RATE: 20 BRPM

## 2020-02-19 DIAGNOSIS — R11.2 NON-INTRACTABLE VOMITING WITH NAUSEA, UNSPECIFIED VOMITING TYPE: Primary | ICD-10-CM

## 2020-02-19 DIAGNOSIS — R19.7 DIARRHEA OF PRESUMED INFECTIOUS ORIGIN: ICD-10-CM

## 2020-02-19 LAB — HCG UR QL: NEGATIVE

## 2020-02-19 PROCEDURE — 74177 CT ABD & PELVIS W/CONTRAST: CPT

## 2020-02-19 PROCEDURE — 80053 COMPREHEN METABOLIC PANEL: CPT

## 2020-02-19 PROCEDURE — 81025 URINE PREGNANCY TEST: CPT

## 2020-02-19 PROCEDURE — 96374 THER/PROPH/DIAG INJ IV PUSH: CPT

## 2020-02-19 PROCEDURE — 74011250636 HC RX REV CODE- 250/636: Performed by: EMERGENCY MEDICINE

## 2020-02-19 PROCEDURE — 83690 ASSAY OF LIPASE: CPT

## 2020-02-19 PROCEDURE — 81001 URINALYSIS AUTO W/SCOPE: CPT

## 2020-02-19 PROCEDURE — 96376 TX/PRO/DX INJ SAME DRUG ADON: CPT

## 2020-02-19 PROCEDURE — 99284 EMERGENCY DEPT VISIT MOD MDM: CPT

## 2020-02-19 PROCEDURE — 36415 COLL VENOUS BLD VENIPUNCTURE: CPT

## 2020-02-19 PROCEDURE — 74011636320 HC RX REV CODE- 636/320: Performed by: EMERGENCY MEDICINE

## 2020-02-19 PROCEDURE — 85025 COMPLETE CBC W/AUTO DIFF WBC: CPT

## 2020-02-19 RX ORDER — ONDANSETRON 2 MG/ML
4 INJECTION INTRAMUSCULAR; INTRAVENOUS
Status: COMPLETED | OUTPATIENT
Start: 2020-02-19 | End: 2020-02-19

## 2020-02-19 RX ORDER — ONDANSETRON 4 MG/1
4 TABLET, ORALLY DISINTEGRATING ORAL
Qty: 12 TAB | Refills: 0 | Status: SHIPPED | OUTPATIENT
Start: 2020-02-19

## 2020-02-19 RX ORDER — ONDANSETRON 2 MG/ML
INJECTION INTRAMUSCULAR; INTRAVENOUS
Status: DISPENSED
Start: 2020-02-19 | End: 2020-02-19

## 2020-02-19 RX ADMIN — ONDANSETRON HYDROCHLORIDE 4 MG: 2 SOLUTION INTRAMUSCULAR; INTRAVENOUS at 02:20

## 2020-02-19 RX ADMIN — SODIUM CHLORIDE 2000 ML: 900 INJECTION, SOLUTION INTRAVENOUS at 02:20

## 2020-02-19 RX ADMIN — IOPAMIDOL 100 ML: 755 INJECTION, SOLUTION INTRAVENOUS at 04:16

## 2020-02-19 RX ADMIN — ONDANSETRON HYDROCHLORIDE 4 MG: 2 SOLUTION INTRAMUSCULAR; INTRAVENOUS at 04:24

## 2020-02-19 NOTE — LETTER
[facility] EMERGENCY DEPT 
[address] [facility] [address] 
[phone] Work/School Note Date: 2/19/2020 To Whom It May concern: 
 
Jane Wells was seen and treated today in the emergency room by the following provider(s): 
Attending Provider: Ron Recio MD. Jane Wells may return to work on 2/22/20. Sincerely, iDlip Bowman MD

## 2020-02-19 NOTE — ED TRIAGE NOTES
Triage note:  Pt arrived with c/o N/V and diarrhea that started tonight at 9 PM.  Pt stated she was on the toilet with vomiting and diarrhea and passed out.

## 2020-02-19 NOTE — DISCHARGE INSTRUCTIONS
Patient Education        Gastroenteritis: Care Instructions  Your Care Instructions    Gastroenteritis is an illness that may cause nausea, vomiting, and diarrhea. It is sometimes called \"stomach flu. \" It can be caused by bacteria or a virus. You will probably begin to feel better in 1 to 2 days. In the meantime, get plenty of rest and make sure you do not become dehydrated. Dehydration occurs when your body loses too much fluid. Follow-up care is a key part of your treatment and safety. Be sure to make and go to all appointments, and call your doctor if you are having problems. It's also a good idea to know your test results and keep a list of the medicines you take. How can you care for yourself at home? · If your doctor prescribed antibiotics, take them as directed. Do not stop taking them just because you feel better. You need to take the full course of antibiotics. · Drink plenty of fluids to prevent dehydration, enough so that your urine is light yellow or clear like water. Choose water and other caffeine-free clear liquids until you feel better. If you have kidney, heart, or liver disease and have to limit fluids, talk with your doctor before you increase your fluid intake. · Drink fluids slowly, in frequent, small amounts, because drinking too much too fast can cause vomiting. · Begin eating mild foods, such as dry toast, yogurt, applesauce, bananas, and rice. Avoid spicy, hot, or high-fat foods, and do not drink alcohol or caffeine for a day or two. Do not drink milk or eat ice cream until you are feeling better. How to prevent gastroenteritis  · Keep hot foods hot and cold foods cold. · Do not eat meats, dressings, salads, or other foods that have been kept at room temperature for more than 2 hours. · Use a thermometer to check your refrigerator. It should be between 34°F and 40°F.  · Defrost meats in the refrigerator or microwave, not on the kitchen counter.   · Keep your hands and your kitchen clean. Wash your hands, cutting boards, and countertops with hot soapy water frequently. · Cook meat until it is well done. · Do not eat raw eggs or uncooked sauces made with raw eggs. · Do not take chances. If food looks or tastes spoiled, throw it out. When should you call for help? Call 911 anytime you think you may need emergency care. For example, call if:    · You vomit blood or what looks like coffee grounds.     · You passed out (lost consciousness).     · You pass maroon or very bloody stools.    Call your doctor now or seek immediate medical care if:    · You have severe belly pain.     · You have signs of needing more fluids. You have sunken eyes, a dry mouth, and pass only a little dark urine.     · You feel like you are going to faint.     · You have increased belly pain that does not go away in 1 to 2 days.     · You have new or increased nausea, or you are vomiting.     · You have a new or higher fever.     · Your stools are black and tarlike or have streaks of blood.    Watch closely for changes in your health, and be sure to contact your doctor if:    · You are dizzy or lightheaded.     · You urinate less than usual, or your urine is dark yellow or brown.     · You do not feel better with each day that goes by. Where can you learn more? Go to http://chadwick-dimas.info/. Enter N142 in the search box to learn more about \"Gastroenteritis: Care Instructions. \"  Current as of: June 9, 2019  Content Version: 12.2  © 7948-2568 Healthwise, Inc[employer]. Care instructions adapted under license by [employer] (which disclaims liability or warranty for this information). If you have questions about a medical condition or this instruction, always ask your healthcare professional. [place] [address] any warranty or liability for your use of this information.

## 2020-02-19 NOTE — ED NOTES
The documentation for this period is being entered following the guidelines as defined in the [facility] policy by Meaghan Chou RN.

## 2020-02-19 NOTE — ED PROVIDER NOTES
History of hypertension, syncope; she presents accompanied by her wife with complaints of a 4-hour history of \"severe\" vomiting and diarrhea. Her wife estimates that she has had over 10 episodes of each. Prior to arrival, she also \"passed out\" and struck her head against the wall in the shower. The emesis and diarrhea have been nonbloody. No known sick contacts. Symptoms are moderate without relieving factors.            Past Medical History:   Diagnosis Date    Hypertension     Syncope 10/19/2017       Past Surgical History:   Procedure Laterality Date    HX APPENDECTOMY      HX OTHER SURGICAL      anal fisure repair         Family History:   Problem Relation Age of Onset    Diabetes Mother     Diabetes Father        Social History     Socioeconomic History    Marital status:      Spouse name: Not on file    Number of children: Not on file    Years of education: Not on file    Highest education level: Not on file   Occupational History    Not on file   Social Needs    Financial resource strain: Not on file    Food insecurity:     Worry: Not on file     Inability: Not on file    Transportation needs:     Medical: Not on file     Non-medical: Not on file   Tobacco Use    Smoking status: Never Smoker    Smokeless tobacco: Never Used   Substance and Sexual Activity    Alcohol use: No    Drug use: No    Sexual activity: Not on file   Lifestyle    Physical activity:     Days per week: Not on file     Minutes per session: Not on file    Stress: Not on file   Relationships    Social connections:     Talks on phone: Not on file     Gets together: Not on file     Attends Faith service: Not on file     Active member of club or organization: Not on file     Attends meetings of clubs or organizations: Not on file     Relationship status: Not on file    Intimate partner violence:     Fear of current or ex partner: Not on file     Emotionally abused: Not on file     Physically abused: Not on file     Forced sexual activity: Not on file   Other Topics Concern    Not on file   Social History Narrative    Not on file         ALLERGIES: Codeine    Review of Systems   All other systems reviewed and are negative. There were no vitals filed for this visit. Physical Exam  Vitals signs and nursing note reviewed. Constitutional:       Appearance: She is well-developed. Comments: Moderately ill-appearing. HENT:      Head: Normocephalic and atraumatic. Eyes:      Conjunctiva/sclera: Conjunctivae normal.   Neck:      Musculoskeletal: Neck supple. Trachea: No tracheal deviation. Cardiovascular:      Rate and Rhythm: Normal rate and regular rhythm. Heart sounds: Normal heart sounds. No murmur. No friction rub. No gallop. Pulmonary:      Effort: Pulmonary effort is normal.      Breath sounds: Normal breath sounds. Abdominal:      Palpations: Abdomen is soft. Tenderness: There is no abdominal tenderness. Musculoskeletal:         General: No deformity. Skin:     General: Skin is warm and dry. Neurological:      Mental Status: She is alert. Comments: oriented          MDM       Procedures    Progress Note:  Results, treatment, and follow up plan have been discussed with patient/wife. Questions were answered. Ye Fried MD  4:45 AM    Assessment/plan: Vomiting/diarrhea -suspect from viral GI illness; reassuring appearance with normal vital signs. CBC remarkable for a white blood cell count of 21,000. I suspect this is related to a stress response. CMP and UA are unremarkable. CT consistent with enteritis. She received a liter of IV fluids and Zofran in the ED and is feeling somewhat better at the time of discharge. Home with Zofran.   Ye Fried MD  4:46 AM

## 2020-02-19 NOTE — ED NOTES
Discharge note: The patient was discharged home in stable condition, accompanied by family member. The patient is alert and oriented, is in no respiratory distress. The patient's diagnosis, condition and treatment were explained to patient by Dr Hue Ordonez and reinforced by nurse. The patient and family party expressed understanding of discharge instructions, prescriptions, and plan of care. A work note was given to pt. A discharge plan has been developed. A  was not involved in the process. Patient offered a wheelchair to ED lob for discharge but declined at this time. Patient ambulatory to ED lobby to go home with family member.

## 2020-02-21 LAB
APPEARANCE UR: ABNORMAL
BACTERIA URNS QL MICRO: ABNORMAL /HPF
BILIRUB UR QL CFM: NEGATIVE
COLOR UR: ABNORMAL
EPITH CASTS URNS QL MICRO: ABNORMAL /LPF
GLUCOSE UR STRIP.AUTO-MCNC: NEGATIVE MG/DL
HGB UR QL STRIP: ABNORMAL
KETONES UR QL STRIP.AUTO: 15 MG/DL
LEUKOCYTE ESTERASE UR QL STRIP.AUTO: NEGATIVE
NITRITE UR QL STRIP.AUTO: NEGATIVE
PH UR STRIP: 6 [PH] (ref 5–8)
PROT UR STRIP-MCNC: 100 MG/DL
RBC #/AREA URNS HPF: ABNORMAL /HPF (ref 0–5)
SP GR UR REFRACTOMETRY: >1.03 (ref 1–1.03)
UROBILINOGEN UR QL STRIP.AUTO: 0.2 EU/DL (ref 0.2–1)
WBC URNS QL MICRO: ABNORMAL /HPF (ref 0–4)

## 2020-03-05 LAB
ALBUMIN SERPL-MCNC: 4 G/DL (ref 3.5–5)
ALBUMIN/GLOB SERPL: 0.9 {RATIO} (ref 1.1–2.2)
ALP SERPL-CCNC: 149 U/L (ref 45–117)
ALT SERPL-CCNC: 25 U/L (ref 12–78)
ANION GAP SERPL CALC-SCNC: 11 MMOL/L (ref 5–15)
AST SERPL-CCNC: 18 U/L (ref 15–37)
BASOPHILS # BLD: 0 K/UL (ref 0–0.1)
BASOPHILS NFR BLD: 0 % (ref 0–1)
BILIRUB SERPL-MCNC: 0.4 MG/DL (ref 0.2–1)
BUN SERPL-MCNC: 14 MG/DL (ref 6–20)
BUN/CREAT SERPL: 13 (ref 12–20)
CALCIUM SERPL-MCNC: 9.1 MG/DL (ref 8.5–10.1)
CHLORIDE SERPL-SCNC: 103 MMOL/L (ref 97–108)
CO2 SERPL-SCNC: 27 MMOL/L (ref 21–32)
CREAT SERPL-MCNC: 1.06 MG/DL (ref 0.55–1.02)
DIFFERENTIAL METHOD BLD: ABNORMAL
EOSINOPHIL # BLD: 0.1 K/UL (ref 0–0.4)
EOSINOPHIL NFR BLD: 0 % (ref 0–7)
ERYTHROCYTE [DISTWIDTH] IN BLOOD BY AUTOMATED COUNT: 12.7 % (ref 11.5–14.5)
GLOBULIN SER CALC-MCNC: 4.5 G/DL (ref 2–4)
GLUCOSE SERPL-MCNC: 156 MG/DL (ref 65–100)
HCT VFR BLD AUTO: 46.5 % (ref 35–47)
HGB BLD-MCNC: 15.4 G/DL (ref 11.5–16)
LIPASE SERPL-CCNC: 95 U/L (ref 73–393)
LYMPHOCYTES # BLD: 0.9 K/UL (ref 0.8–3.5)
LYMPHOCYTES NFR BLD: 4 % (ref 12–49)
MCH RBC QN AUTO: 28.9 PG (ref 26–34)
MCHC RBC AUTO-ENTMCNC: 33.1 G/DL (ref 30–36.5)
MCV RBC AUTO: 87.4 FL (ref 80–99)
MONOCYTES # BLD: 0.7 K/UL (ref 0–1)
MONOCYTES NFR BLD: 3 % (ref 5–13)
NEUTS SEG # BLD: 19 K/UL (ref 1.8–8)
NEUTS SEG NFR BLD: 93 % (ref 32–75)
PLATELET # BLD AUTO: 482 K/UL (ref 150–400)
PMV BLD AUTO: 10.6 FL (ref 8.9–12.9)
POTASSIUM SERPL-SCNC: 3.9 MMOL/L (ref 3.5–5.1)
PROT SERPL-MCNC: 8.5 G/DL (ref 6.4–8.2)
RBC # BLD AUTO: 5.32 M/UL (ref 3.8–5.2)
SODIUM SERPL-SCNC: 141 MMOL/L (ref 136–145)
WBC # BLD AUTO: 20.7 K/UL (ref 3.6–11)

## 2022-03-18 PROBLEM — D72.829 LEUKOCYTOSIS: Status: ACTIVE | Noted: 2017-10-19

## 2022-03-19 PROBLEM — E03.9 ACQUIRED HYPOTHYROIDISM: Status: ACTIVE | Noted: 2017-10-20

## 2022-03-19 PROBLEM — R55 BLACKOUT: Status: ACTIVE | Noted: 2017-10-19

## 2022-03-20 PROBLEM — E66.9 OBESITY (BMI 30-39.9): Status: ACTIVE | Noted: 2017-10-19

## 2024-07-23 ENCOUNTER — HOSPITAL ENCOUNTER (OUTPATIENT)
Facility: HOSPITAL | Age: 44
Discharge: HOME | End: 2024-07-26
Attending: FAMILY MEDICINE
Payer: COMMERCIAL

## 2024-07-23 ENCOUNTER — TRANSCRIBE ORDERS (OUTPATIENT)
Facility: HOSPITAL | Age: 44
End: 2024-07-23

## 2024-07-23 DIAGNOSIS — R55 SYNCOPE AND COLLAPSE: ICD-10-CM

## 2024-07-23 DIAGNOSIS — R55 SYNCOPE AND COLLAPSE: Primary | ICD-10-CM

## 2024-07-23 DIAGNOSIS — R00.2 PALPITATIONS: ICD-10-CM

## 2024-07-23 PROCEDURE — 6360000004 HC RX CONTRAST MEDICATION: Performed by: FAMILY MEDICINE

## 2024-07-23 PROCEDURE — 71275 CT ANGIOGRAPHY CHEST: CPT

## 2024-07-23 RX ADMIN — IOPAMIDOL 100 ML: 755 INJECTION, SOLUTION INTRAVENOUS at 12:09
